# Patient Record
Sex: FEMALE | NOT HISPANIC OR LATINO | Employment: FULL TIME
[De-identification: names, ages, dates, MRNs, and addresses within clinical notes are randomized per-mention and may not be internally consistent; named-entity substitution may affect disease eponyms.]

---

## 2017-07-22 ENCOUNTER — HEALTH MAINTENANCE LETTER (OUTPATIENT)
Age: 27
End: 2017-07-22

## 2017-12-02 ENCOUNTER — RECORDS - HEALTHEAST (OUTPATIENT)
Dept: LAB | Facility: CLINIC | Age: 27
End: 2017-12-02

## 2017-12-02 LAB
HBV SURFACE AG SERPL QL IA: NEGATIVE
HCV AB SERPL QL IA: NEGATIVE
HIV 1+2 AB+HIV1 P24 AG SERPL QL IA: NEGATIVE

## 2018-03-27 ENCOUNTER — RECORDS - HEALTHEAST (OUTPATIENT)
Dept: LAB | Facility: CLINIC | Age: 28
End: 2018-03-27

## 2018-03-28 LAB
HBV SURFACE AB SERPL IA-ACNC: POSITIVE M[IU]/ML
HBV SURFACE AG SERPL QL IA: NEGATIVE
HCV AB SERPL QL IA: NEGATIVE
HIV 1+2 AB+HIV1 P24 AG SERPL QL IA: NEGATIVE

## 2018-11-21 ENCOUNTER — OFFICE VISIT (OUTPATIENT)
Dept: URGENT CARE | Facility: URGENT CARE | Age: 28
End: 2018-11-21
Payer: COMMERCIAL

## 2018-11-21 VITALS
TEMPERATURE: 98.7 F | SYSTOLIC BLOOD PRESSURE: 120 MMHG | RESPIRATION RATE: 16 BRPM | HEIGHT: 63 IN | DIASTOLIC BLOOD PRESSURE: 78 MMHG | HEART RATE: 123 BPM | BODY MASS INDEX: 33.82 KG/M2 | OXYGEN SATURATION: 99 % | WEIGHT: 190.9 LBS

## 2018-11-21 DIAGNOSIS — H92.01 POSTERIOR AURICULAR PAIN OF RIGHT EAR: Primary | ICD-10-CM

## 2018-11-21 PROCEDURE — 99203 OFFICE O/P NEW LOW 30 MIN: CPT | Performed by: PHYSICIAN ASSISTANT

## 2018-11-22 ASSESSMENT — ENCOUNTER SYMPTOMS
FEVER: 0
VOMITING: 0
SORE THROAT: 0
COUGH: 0
CHILLS: 0
DIARRHEA: 0

## 2018-11-22 NOTE — PROGRESS NOTES
"SUBJECTIVE:   Praveena Hernandez is a 28 year old female presenting with a chief complaint of   Chief Complaint   Patient presents with     Lesion     on back of ear       She is a new patient of [facility].    She is presenting to urgent care tonight with a complaint of a swollen and tender area behind the right ear. Has noticed 2 days ago. Symptoms are worsening. She believes the area has increased in size. Denies any trauma or injury. No fever/chills or sore throat. No cough.       Review of Systems   Constitutional: Negative for chills and fever.   HENT: Negative for sore throat.         Tender area right posterior auricular area   Respiratory: Negative for cough.    Gastrointestinal: Negative for diarrhea and vomiting.       History reviewed. No pertinent past medical history.  Family History   Problem Relation Age of Onset     Cancer Father      dx with Hodgkins     Diabetes Maternal Grandmother      Current Outpatient Prescriptions   Medication Sig Dispense Refill     amoxicillin-clavulanate (AUGMENTIN) 875-125 MG per tablet Take 1 tablet by mouth 2 times daily 20 tablet 0     NO ACTIVE MEDICATIONS .       Social History   Substance Use Topics     Smoking status: Never Smoker     Smokeless tobacco: Never Used      Comment: no smokers in the home     Alcohol use No       OBJECTIVE  /78 (BP Location: Right arm, Patient Position: Chair, Cuff Size: Adult Regular)  Pulse 123  Temp 98.7  F (37.1  C) (Oral)  Resp 16  Ht 5' 2.5\" (1.588 m)  Wt 190 lb 14.4 oz (86.6 kg)  LMP 11/01/2018 (Approximate)  SpO2 99%  Breastfeeding? No  BMI 34.36 kg/m2    Physical Exam   Constitutional: She appears well-developed and well-nourished. No distress.   HENT:   Head: Normocephalic and atraumatic.   Right Ear: Tympanic membrane and external ear normal.   Left Ear: Tympanic membrane and external ear normal.   Mouth/Throat: Oropharynx is clear and moist.   Tender area right posterior auricular area, about 2 cm in diameter. No " erythema. No fluctuance. Query reactive lymph node vs infection   Eyes: Conjunctivae are normal.   Neck: Normal range of motion.   Cardiovascular: Regular rhythm and normal heart sounds.    Pulmonary/Chest: Effort normal and breath sounds normal. No respiratory distress.   Neurological: She is alert.   Skin: Skin is warm and dry.   Psychiatric: She has a normal mood and affect.       Labs:  No results found for this or any previous visit (from the past 24 hour(s)).        ASSESSMENT:      ICD-10-CM    1. Posterior auricular pain of right ear H92.01 amoxicillin-clavulanate (AUGMENTIN) 875-125 MG per tablet        Medical Decision Making:    Differential Diagnosis:  Reactive lymph node, infection etc...    Serious Comorbid Conditions:  Adult:  None    PLAN:    Right posterior auricular pain: query reactive lymph node vs infection. Augmentin is prescribed. She can take tylenol or motrin as needed for pain. Follow up if any worsening symptoms. She agrees.     Followup:    If not improving or if condition worsens, follow up with your Primary Care Provider

## 2019-12-20 ENCOUNTER — RECORDS - HEALTHEAST (OUTPATIENT)
Dept: LAB | Facility: CLINIC | Age: 29
End: 2019-12-20

## 2019-12-23 ENCOUNTER — AMBULATORY - HEALTHEAST (OUTPATIENT)
Dept: INFECTIOUS DISEASES | Facility: CLINIC | Age: 29
End: 2019-12-23

## 2020-01-10 ENCOUNTER — RECORDS - HEALTHEAST (OUTPATIENT)
Dept: LAB | Facility: CLINIC | Age: 30
End: 2020-01-10

## 2020-01-10 LAB
ALBUMIN SERPL-MCNC: 3.7 G/DL (ref 3.5–5)
ALP SERPL-CCNC: 87 U/L (ref 45–120)
ALT SERPL W P-5'-P-CCNC: 29 U/L (ref 0–45)
ANION GAP SERPL CALCULATED.3IONS-SCNC: 8 MMOL/L (ref 5–18)
AST SERPL W P-5'-P-CCNC: 29 U/L (ref 0–40)
BILIRUB SERPL-MCNC: 0.3 MG/DL (ref 0–1)
BUN SERPL-MCNC: 13 MG/DL (ref 8–22)
CALCIUM SERPL-MCNC: 9.2 MG/DL (ref 8.5–10.5)
CHLORIDE BLD-SCNC: 105 MMOL/L (ref 98–107)
CO2 SERPL-SCNC: 23 MMOL/L (ref 22–31)
CREAT SERPL-MCNC: 0.72 MG/DL (ref 0.6–1.1)
ERYTHROCYTE [DISTWIDTH] IN BLOOD BY AUTOMATED COUNT: 13.2 % (ref 11–14.5)
GFR SERPL CREATININE-BSD FRML MDRD: >60 ML/MIN/1.73M2
GLUCOSE BLD-MCNC: 87 MG/DL (ref 70–125)
HCT VFR BLD AUTO: 38 % (ref 35–47)
HGB BLD-MCNC: 12 G/DL (ref 12–16)
MCH RBC QN AUTO: 26.8 PG (ref 27–34)
MCHC RBC AUTO-ENTMCNC: 31.6 G/DL (ref 32–36)
MCV RBC AUTO: 85 FL (ref 80–100)
PLATELET # BLD AUTO: 307 THOU/UL (ref 140–440)
PMV BLD AUTO: 9.5 FL (ref 8.5–12.5)
POTASSIUM BLD-SCNC: 4 MMOL/L (ref 3.5–5)
PROT SERPL-MCNC: 7.4 G/DL (ref 6–8)
RBC # BLD AUTO: 4.48 MILL/UL (ref 3.8–5.4)
SODIUM SERPL-SCNC: 136 MMOL/L (ref 136–145)
WBC: 7.8 THOU/UL (ref 4–11)

## 2020-06-09 ENCOUNTER — RESULTS ONLY (OUTPATIENT)
Dept: LAB | Age: 30
End: 2020-06-09

## 2020-06-09 ENCOUNTER — APPOINTMENT (OUTPATIENT)
Dept: URGENT CARE | Facility: URGENT CARE | Age: 30
End: 2020-06-09
Payer: COMMERCIAL

## 2020-06-10 LAB
SARS-COV-2 RNA SPEC QL NAA+PROBE: NOT DETECTED
SPECIMEN SOURCE: NORMAL

## 2020-08-21 ENCOUNTER — RECORDS - HEALTHEAST (OUTPATIENT)
Dept: LAB | Facility: CLINIC | Age: 30
End: 2020-08-21

## 2020-08-26 LAB
GAMMA INTERFERON BACKGROUND BLD IA-ACNC: 0.06 IU/ML
M TB IFN-G BLD-IMP: NEGATIVE
MITOGEN IGNF BCKGRD COR BLD-ACNC: -0.01 IU/ML
MITOGEN IGNF BCKGRD COR BLD-ACNC: 0.01 IU/ML
QTF INTERPRETATION: NORMAL
QTF MITOGEN - NIL: 5.13 IU/ML

## 2021-05-24 ENCOUNTER — HOSPITAL ENCOUNTER (EMERGENCY)
Dept: EMERGENCY MEDICINE | Facility: HOSPITAL | Age: 31
Discharge: HOME | End: 2021-05-24
Attending: EMERGENCY MEDICINE
Payer: COMMERCIAL

## 2021-05-24 ENCOUNTER — RECORDS - HEALTHEAST (OUTPATIENT)
Dept: LAB | Facility: HOSPITAL | Age: 31
End: 2021-05-24
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
## 2021-05-24 LAB
ALBUMIN SERPL-MCNC: 3.8 G/DL (ref 3.5–5)
ALP SERPL-CCNC: 75 U/L (ref 45–120)
ALP SERPL-CCNC: 76 U/L (ref 45–120)
ALT SERPL W P-5'-P-CCNC: 28 U/L (ref 0–45)
ALT SERPL W P-5'-P-CCNC: 29 U/L (ref 0–45)
ANION GAP SERPL CALCULATED.3IONS-SCNC: 11 MMOL/L (ref 5–18)
ANION GAP SERPL CALCULATED.3IONS-SCNC: 8 MMOL/L (ref 5–18)
AST SERPL W P-5'-P-CCNC: 25 U/L (ref 0–40)
AST SERPL W P-5'-P-CCNC: 27 U/L (ref 0–40)
BASOPHILS # BLD AUTO: 0 THOU/UL (ref 0–0.2)
BASOPHILS NFR BLD AUTO: 0 % (ref 0–2)
BILIRUB DIRECT SERPL-MCNC: <0.1 MG/DL
BILIRUB SERPL-MCNC: 0.2 MG/DL (ref 0–1)
BILIRUB SERPL-MCNC: 0.2 MG/DL (ref 0–1)
BUN SERPL-MCNC: 13 MG/DL (ref 8–22)
BUN SERPL-MCNC: 13 MG/DL (ref 8–22)
CALCIUM SERPL-MCNC: 8.9 MG/DL (ref 8.5–10.5)
CALCIUM SERPL-MCNC: 9 MG/DL (ref 8.5–10.5)
CHLORIDE BLD-SCNC: 107 MMOL/L (ref 98–107)
CHLORIDE BLD-SCNC: 107 MMOL/L (ref 98–107)
CK SERPL-CCNC: 117 U/L (ref 30–190)
CO2 SERPL-SCNC: 21 MMOL/L (ref 22–31)
CO2 SERPL-SCNC: 22 MMOL/L (ref 22–31)
CREAT SERPL-MCNC: 0.74 MG/DL (ref 0.6–1.1)
CREAT SERPL-MCNC: 0.75 MG/DL (ref 0.6–1.1)
EOSINOPHIL # BLD AUTO: 0.1 THOU/UL (ref 0–0.4)
EOSINOPHIL NFR BLD AUTO: 2 % (ref 0–6)
ERYTHROCYTE [DISTWIDTH] IN BLOOD BY AUTOMATED COUNT: 13.2 % (ref 11–14.5)
GFR SERPL CREATININE-BSD FRML MDRD: >60 ML/MIN/1.73M2
GFR SERPL CREATININE-BSD FRML MDRD: >60 ML/MIN/1.73M2
GLUCOSE BLD-MCNC: 102 MG/DL (ref 70–125)
GLUCOSE BLD-MCNC: 105 MG/DL (ref 70–125)
HCG SERPL QL: NEGATIVE
HCT VFR BLD AUTO: 40.4 % (ref 35–47)
HGB BLD-MCNC: 12.5 G/DL (ref 12–16)
HIV 1+2 AB+HIV1 P24 AG SERPL QL IA: NEGATIVE
IMM GRANULOCYTES # BLD: 0 THOU/UL
IMM GRANULOCYTES NFR BLD: 0 %
LYMPHOCYTES # BLD AUTO: 2.4 THOU/UL (ref 0.8–4.4)
LYMPHOCYTES NFR BLD AUTO: 32 % (ref 20–40)
MCH RBC QN AUTO: 26.7 PG (ref 27–34)
MCHC RBC AUTO-ENTMCNC: 30.9 G/DL (ref 32–36)
MCV RBC AUTO: 86 FL (ref 80–100)
MONOCYTES # BLD AUTO: 0.5 THOU/UL (ref 0–0.9)
MONOCYTES NFR BLD AUTO: 7 % (ref 2–10)
NEUTROPHILS # BLD AUTO: 4.4 THOU/UL (ref 2–7.7)
NEUTROPHILS NFR BLD AUTO: 59 % (ref 50–70)
PHOSPHATE SERPL-MCNC: 3 MG/DL (ref 2.5–4.5)
PLATELET # BLD AUTO: 351 THOU/UL (ref 140–440)
PMV BLD AUTO: 9.9 FL (ref 8.5–12.5)
POTASSIUM BLD-SCNC: 4.5 MMOL/L (ref 3.5–5)
POTASSIUM BLD-SCNC: 5 MMOL/L (ref 3.5–5)
PROT SERPL-MCNC: 7.8 G/DL (ref 6–8)
PROT SERPL-MCNC: 7.9 G/DL (ref 6–8)
RBC # BLD AUTO: 4.68 MILL/UL (ref 3.8–5.4)
SODIUM SERPL-SCNC: 137 MMOL/L (ref 136–145)
SODIUM SERPL-SCNC: 139 MMOL/L (ref 136–145)
WBC: 7.5 THOU/UL (ref 4–11)

## 2021-05-25 LAB
HBV SURFACE AG SERPL QL IA: NEGATIVE
HCV AB SERPL QL IA: NEGATIVE

## 2021-05-29 ENCOUNTER — HEALTH MAINTENANCE LETTER (OUTPATIENT)
Age: 31
End: 2021-05-29

## 2021-06-17 NOTE — ED TRIAGE NOTES
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 Here for prophylactic treatment. Already had blood work done.

## 2021-06-17 NOTE — ED PROVIDER NOTES
EMERGENCY DEPARTMENT ENCOUNTER      NAME: Praveena Hernandez  AGE: 30 y.o. female  YOB: 1990  MRN: [id number]  EVALUATION DATE & TIME: 5/24/2021  3:25 PM    PCP: Angus Mac PA-C    ED PROVIDER: Edgar Ponce M.D.      Chief Complaint   Patient presents with     Needle Stick         FINAL IMPRESSION:  1. Needle stick, hypodermic, accidental, initial encounter          ED COURSE & MEDICAL DECISION MAKING:    Pertinent Labs & Imaging studies reviewed. (See chart for details)  I did see and examine Praveena Hernandez.     Accidental needlestick at work.  This was a low bore needle that had been used on a patient.  The patient was unable to be contacted despite multiple attempts.    We have been in contact with Kaitlin with occupational health and we will start dual medication postexposure prophylactic therapy here and send off the initial laboratory studies via the exposure order set protocol.    She states that while on postexposure prophylactic medication in the past she had very bad nausea and vertigo.  For this reason I will prescribe some Zofran, meclizine and Valium that she can take for symptomatic management.    She will not drive if she is on the Valium.  She will need to follow very closely with occupational health.  She has no other complaints plan is for discharge at this time    At the conclusion of the encounter I discussed the results of all of the tests and the disposition. The questions were answered. The patient or family acknowledged understanding and was agreeable with the care plan.     3:32 PM I met with the patient, obtained history, performed an initial exam, and discussed options and plan for diagnostics and treatment here in the ED. PPE (surgical mask and gloves) was worn during patient encounters while patient wore a mask.       MEDICATIONS GIVEN IN THE EMERGENCY:  Medications   emtricitabine-tenofovir (TDF) 200-300 mg per tablet 1 tablet (TRUVADA) (has no administration in time range)    raltegravir tablet 400 mg (ISENTRESS) (has no administration in time range)       NEW PRESCRIPTIONS STARTED AT TODAY'S ER VISIT  Current Discharge Medication List      START taking these medications    Details   diazePAM (VALIUM) 5 MG tablet Take 1 tablet (5 mg total) by mouth every 12 (twelve) hours as needed for other (vertigo).  Qty: 15 tablet, Refills: 0    Associated Diagnoses: Needle stick, hypodermic, accidental, initial encounter      emtricitabine-tenofovir, TDF, (TRUVADA) 200-300 mg per tablet Take 1 tablet by mouth daily for 5 days.  Qty: 5 tablet, Refills: 0    Associated Diagnoses: Needle stick, hypodermic, accidental, initial encounter      meclizine (ANTIVERT) 25 mg tablet Take 1 tablet (25 mg total) by mouth 3 (three) times a day as needed.  Qty: 30 tablet, Refills: 0    Associated Diagnoses: Needle stick, hypodermic, accidental, initial encounter      ondansetron (ZOFRAN) 4 MG tablet Take 1 tablet (4 mg total) by mouth every 6 (six) hours for 3 days.  Qty: 12 tablet, Refills: 0    Associated Diagnoses: Needle stick, hypodermic, accidental, initial encounter      raltegravir (ISENTRESS) 400 mg tablet Take 1 tablet (400 mg total) by mouth 2 (two) times a day for 5 days.  Qty: 10 tablet, Refills: 0    Associated Diagnoses: Needle stick, hypodermic, accidental, initial encounter         CONTINUE these medications which have NOT CHANGED    Details   amoxicillin-clavulanate (AUGMENTIN) 875-125 mg per tablet Take 1 tablet by mouth 2 (two) times a day.                =================================================================    HPI    Patient information was obtained from: Patient    Use of Intrepreter: N/A     Praveena Hernandez is a 30 y.o. female without a pertinent medical history who presents to the ED for evaluation of needle stick.    Patient reports having an accidental needlestick with a hollow bore needle that was used for local anesthetic on lack repair on the patient's a couple of hours ago.   The patient is ready been discharged and is not answering phone calls for return for any testing.    The patient has no other complaints during this visit.    Patient denies additional medical concerns or complaints at this time.      REVIEW OF SYSTEMS   Review of Systems   Respiratory: Negative for shortness of breath.    Cardiovascular: Negative for chest pain.   Gastrointestinal: Negative for abdominal pain.   Skin: Positive for wound.        VITALS:  Patient Vitals for the past 24 hrs:   BP Temp Temp src Pulse Resp SpO2   05/24/21 1523 127/85 98.3  F (36.8  C) Oral (!) 122 16 99 %       PHYSICAL EXAM    Physical Exam   Constitutional: She appears well-developed and well-nourished. No distress.   HENT:   Head: Normocephalic and atraumatic.   Pulmonary/Chest: Effort normal.   Neurological: She is alert.   Skin: Skin is warm and dry.   No wound visualized at this point   Psychiatric: She has a normal mood and affect.   Nursing note and vitals reviewed.        PAST MEDICAL HISTORY:  Past Medical History:   Diagnosis Date     Healthy adult        PAST SURGICAL HISTORY:  Past Surgical History:   Procedure Laterality Date     other      no prior surgery         CURRENT MEDICATIONS:    No current facility-administered medications on file prior to encounter.      Current Outpatient Medications on File Prior to Encounter   Medication Sig     amoxicillin-clavulanate (AUGMENTIN) 875-125 mg per tablet Take 1 tablet by mouth 2 (two) times a day.       ALLERGIES:  Allergies   Allergen Reactions     Sulfa (Sulfonamide Antibiotics) Shortness Of Breath       FAMILY HISTORY:  Family History   Problem Relation Age of Onset     Thyroid disease Mother      No Medical Problems Sister      No Medical Problems Sister      No Medical Problems Father        SOCIAL HISTORY:   Social History     Socioeconomic History     Marital status: Single     Spouse name: None     Number of children: None     Years of education: None     Highest  education level: None   Occupational History     None   Social Needs     Financial resource strain: None     Food insecurity     Worry: None     Inability: None     Transportation needs     Medical: None     Non-medical: None   Tobacco Use     Smoking status: Never Smoker     Smokeless tobacco: Never Used   Substance and Sexual Activity     Alcohol use: No     Comment: rare     Drug use: No     Sexual activity: Never     Partners: Male   Lifestyle     Physical activity     Days per week: None     Minutes per session: None     Stress: None   Relationships     Social connections     Talks on phone: None     Gets together: None     Attends Quaker service: None     Active member of club or organization: None     Attends meetings of clubs or organizations: None     Relationship status: None     Intimate partner violence     Fear of current or ex partner: None     Emotionally abused: None     Physically abused: None     Forced sexual activity: None   Other Topics Concern     None   Social History Narrative    Lives with parents and sisters.  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  She exercises 3 days/week with walking and bahman classes.            LAB:  All pertinent labs reviewed and interpreted.  No results found for this visit on 05/24/21.    RADIOLOGY:  Reviewed all pertinent imaging. Please see official radiology report.  No results found.        I, Stephanie Flores am serving as a scribe to document services personally performed by Dr. Ponce based on my observation and the provider's statements to me. I, Edgar Ponce MD attest that Stephanie Flores is acting in a scribe capacity, has observed my performance of the services and has documented them in accordance with my direction.    Edgar Ponce M.D.  Emergency Medicine  [facility] EMERGENCY DEPARTMENT  [address]  [facility] [address]  Dept: [phone]  Loc: [phone]         Edgar Ponce MD  05/24/21 1479

## 2021-08-23 ENCOUNTER — APPOINTMENT (OUTPATIENT)
Dept: FAMILY MEDICINE | Facility: CLINIC | Age: 31
End: 2021-08-23
Payer: COMMERCIAL

## 2021-08-23 ENCOUNTER — LAB REQUISITION (OUTPATIENT)
Dept: LAB | Facility: CLINIC | Age: 31
End: 2021-08-23

## 2021-08-23 LAB — SARS-COV-2 RNA RESP QL NAA+PROBE: NEGATIVE

## 2021-08-23 PROCEDURE — U0003 INFECTIOUS AGENT DETECTION BY NUCLEIC ACID (DNA OR RNA); SEVERE ACUTE RESPIRATORY SYNDROME CORONAVIRUS 2 (SARS-COV-2) (CORONAVIRUS DISEASE [COVID-19]), AMPLIFIED PROBE TECHNIQUE, MAKING USE OF HIGH THROUGHPUT TECHNOLOGIES AS DESCRIBED BY CMS-2020-01-R: HCPCS | Performed by: INTERNAL MEDICINE

## 2021-08-27 ENCOUNTER — LAB REQUISITION (OUTPATIENT)
Dept: LAB | Facility: CLINIC | Age: 31
End: 2021-08-27

## 2021-08-27 ENCOUNTER — APPOINTMENT (OUTPATIENT)
Dept: FAMILY MEDICINE | Facility: CLINIC | Age: 31
End: 2021-08-27
Payer: COMMERCIAL

## 2021-08-27 PROCEDURE — U0003 INFECTIOUS AGENT DETECTION BY NUCLEIC ACID (DNA OR RNA); SEVERE ACUTE RESPIRATORY SYNDROME CORONAVIRUS 2 (SARS-COV-2) (CORONAVIRUS DISEASE [COVID-19]), AMPLIFIED PROBE TECHNIQUE, MAKING USE OF HIGH THROUGHPUT TECHNOLOGIES AS DESCRIBED BY CMS-2020-01-R: HCPCS | Performed by: INTERNAL MEDICINE

## 2021-08-28 LAB — SARS-COV-2 RNA RESP QL NAA+PROBE: NEGATIVE

## 2021-09-18 ENCOUNTER — HEALTH MAINTENANCE LETTER (OUTPATIENT)
Age: 31
End: 2021-09-18

## 2021-11-24 DIAGNOSIS — Z57.8 EMPLOYEE EXPOSURE TO BLOOD: Primary | ICD-10-CM

## 2021-11-24 SDOH — HEALTH STABILITY - PHYSICAL HEALTH: OCCUPATIONAL EXPOSURE TO OTHER RISK FACTORS: Z57.8

## 2021-12-16 ENCOUNTER — APPOINTMENT (OUTPATIENT)
Dept: FAMILY MEDICINE | Facility: CLINIC | Age: 31
End: 2021-12-16

## 2021-12-16 ENCOUNTER — LAB REQUISITION (OUTPATIENT)
Dept: LAB | Facility: CLINIC | Age: 31
End: 2021-12-16

## 2021-12-16 LAB — SARS-COV-2 RNA RESP QL NAA+PROBE: NEGATIVE

## 2021-12-16 PROCEDURE — U0005 INFEC AGEN DETEC AMPLI PROBE: HCPCS | Performed by: INTERNAL MEDICINE

## 2022-06-25 ENCOUNTER — HEALTH MAINTENANCE LETTER (OUTPATIENT)
Age: 32
End: 2022-06-25

## 2022-11-20 ENCOUNTER — HEALTH MAINTENANCE LETTER (OUTPATIENT)
Age: 32
End: 2022-11-20

## 2023-11-01 ENCOUNTER — TELEPHONE (OUTPATIENT)
Dept: SURGERY | Facility: CLINIC | Age: 33
End: 2023-11-01

## 2023-11-01 ENCOUNTER — MYC MEDICAL ADVICE (OUTPATIENT)
Dept: FAMILY MEDICINE | Facility: CLINIC | Age: 33
End: 2023-11-01

## 2023-11-01 DIAGNOSIS — Z91.89 AT RISK FOR HYPERTENSION: ICD-10-CM

## 2023-11-01 DIAGNOSIS — O09.91 SUPERVISION OF HIGH-RISK PREGNANCY, FIRST TRIMESTER: Primary | ICD-10-CM

## 2023-11-01 DIAGNOSIS — Z91.89 AT RISK FOR VENOUS THROMBOEMBOLISM (VTE): ICD-10-CM

## 2023-11-26 LAB
ABO/RH(D): NORMAL
ANTIBODY SCREEN: NEGATIVE
SPECIMEN EXPIRATION DATE: NORMAL

## 2023-11-27 ENCOUNTER — HOSPITAL ENCOUNTER (OUTPATIENT)
Dept: ULTRASOUND IMAGING | Facility: CLINIC | Age: 33
Discharge: HOME | End: 2023-11-27
Attending: ADVANCED PRACTICE MIDWIFE
Payer: COMMERCIAL

## 2023-11-27 ENCOUNTER — LAB (OUTPATIENT)
Dept: LAB | Facility: CLINIC | Age: 33
End: 2023-11-27
Attending: ADVANCED PRACTICE MIDWIFE
Payer: COMMERCIAL

## 2023-11-27 ENCOUNTER — PRENATAL OFFICE VISIT (OUTPATIENT)
Dept: MIDWIFE SERVICES | Facility: CLINIC | Age: 33
End: 2023-11-27
Payer: COMMERCIAL

## 2023-11-27 VITALS
HEIGHT: 63 IN | BODY MASS INDEX: 41.29 KG/M2 | SYSTOLIC BLOOD PRESSURE: 124 MMHG | DIASTOLIC BLOOD PRESSURE: 80 MMHG | WEIGHT: 233 LBS | HEART RATE: 92 BPM

## 2023-11-27 DIAGNOSIS — O09.91 SUPERVISION OF HIGH-RISK PREGNANCY, FIRST TRIMESTER: ICD-10-CM

## 2023-11-27 DIAGNOSIS — O09.899 SUPERVISION OF OTHER HIGH RISK PREGNANCY, ANTEPARTUM: ICD-10-CM

## 2023-11-27 DIAGNOSIS — Z91.89 AT RISK FOR HYPERTENSION: ICD-10-CM

## 2023-11-27 DIAGNOSIS — O09.899 SUPERVISION OF OTHER HIGH RISK PREGNANCY, ANTEPARTUM: Primary | ICD-10-CM

## 2023-11-27 DIAGNOSIS — R73.02 GLUCOSE INTOLERANCE (IMPAIRED GLUCOSE TOLERANCE): ICD-10-CM

## 2023-11-27 DIAGNOSIS — R73.03 PRE-DIABETES: ICD-10-CM

## 2023-11-27 DIAGNOSIS — Z91.89 AT RISK FOR VENOUS THROMBOEMBOLISM (VTE): ICD-10-CM

## 2023-11-27 LAB
BASOPHILS # BLD AUTO: 0 10E3/UL (ref 0–0.2)
BASOPHILS NFR BLD AUTO: 0 %
EOSINOPHIL # BLD AUTO: 0.1 10E3/UL (ref 0–0.7)
EOSINOPHIL NFR BLD AUTO: 1 %
ERYTHROCYTE [DISTWIDTH] IN BLOOD BY AUTOMATED COUNT: 13.9 % (ref 10–15)
GLUCOSE 1H P 50 G GLC PO SERPL-MCNC: 156 MG/DL (ref 70–129)
HCT VFR BLD AUTO: 37.7 % (ref 35–47)
HGB BLD-MCNC: 12.3 G/DL (ref 11.7–15.7)
IMM GRANULOCYTES # BLD: 0 10E3/UL
IMM GRANULOCYTES NFR BLD: 0 %
LYMPHOCYTES # BLD AUTO: 2.4 10E3/UL (ref 0.8–5.3)
LYMPHOCYTES NFR BLD AUTO: 26 %
MCH RBC QN AUTO: 27.2 PG (ref 26.5–33)
MCHC RBC AUTO-ENTMCNC: 32.6 G/DL (ref 31.5–36.5)
MCV RBC AUTO: 83 FL (ref 78–100)
MONOCYTES # BLD AUTO: 0.7 10E3/UL (ref 0–1.3)
MONOCYTES NFR BLD AUTO: 7 %
NEUTROPHILS # BLD AUTO: 6.1 10E3/UL (ref 1.6–8.3)
NEUTROPHILS NFR BLD AUTO: 66 %
NRBC # BLD AUTO: 0 10E3/UL
NRBC BLD AUTO-RTO: 0 /100
PLATELET # BLD AUTO: 288 10E3/UL (ref 150–450)
RBC # BLD AUTO: 4.53 10E6/UL (ref 3.8–5.2)
WBC # BLD AUTO: 9.3 10E3/UL (ref 4–11)

## 2023-11-27 PROCEDURE — 86850 RBC ANTIBODY SCREEN: CPT

## 2023-11-27 PROCEDURE — 86780 TREPONEMA PALLIDUM: CPT | Performed by: ADVANCED PRACTICE MIDWIFE

## 2023-11-27 PROCEDURE — 99215 OFFICE O/P EST HI 40 MIN: CPT | Performed by: ADVANCED PRACTICE MIDWIFE

## 2023-11-27 PROCEDURE — 36415 COLL VENOUS BLD VENIPUNCTURE: CPT | Performed by: ADVANCED PRACTICE MIDWIFE

## 2023-11-27 PROCEDURE — 86901 BLOOD TYPING SEROLOGIC RH(D): CPT

## 2023-11-27 PROCEDURE — 86803 HEPATITIS C AB TEST: CPT | Performed by: ADVANCED PRACTICE MIDWIFE

## 2023-11-27 PROCEDURE — 87389 HIV-1 AG W/HIV-1&-2 AB AG IA: CPT | Performed by: ADVANCED PRACTICE MIDWIFE

## 2023-11-27 PROCEDURE — 86762 RUBELLA ANTIBODY: CPT | Performed by: ADVANCED PRACTICE MIDWIFE

## 2023-11-27 PROCEDURE — 87086 URINE CULTURE/COLONY COUNT: CPT | Performed by: ADVANCED PRACTICE MIDWIFE

## 2023-11-27 PROCEDURE — 82950 GLUCOSE TEST: CPT | Performed by: ADVANCED PRACTICE MIDWIFE

## 2023-11-27 PROCEDURE — 76801 OB US < 14 WKS SINGLE FETUS: CPT

## 2023-11-27 PROCEDURE — 87340 HEPATITIS B SURFACE AG IA: CPT | Performed by: ADVANCED PRACTICE MIDWIFE

## 2023-11-27 PROCEDURE — 85025 COMPLETE CBC W/AUTO DIFF WBC: CPT | Performed by: ADVANCED PRACTICE MIDWIFE

## 2023-11-27 RX ORDER — PYRIDOXINE HCL (VITAMIN B6) 25 MG
25 TABLET ORAL DAILY
Status: ON HOLD | COMMUNITY
End: 2024-05-27

## 2023-11-27 RX ORDER — AZELAIC ACID 0.15 G/G
GEL TOPICAL
Status: ON HOLD | COMMUNITY
Start: 2023-09-15 | End: 2024-05-27

## 2023-11-27 RX ORDER — ALBUTEROL SULFATE 90 UG/1
1-2 AEROSOL, METERED RESPIRATORY (INHALATION)
COMMUNITY
Start: 2023-10-11

## 2023-11-27 NOTE — PROGRESS NOTES
PRENATAL VISIT   FIRST OBSTETRICAL EXAM      Subjective:     Praveena is a 33 year old female, who presents to clinic today for a first OB visit.  She is accompanied by her  Rosendo.      This pregnancy was a planned.  The couple haven't been preventing pregnancy for 2 years, and are so excited to finally be pregnant!      DATING  Patient's last menstrual period was 2023.  Praveena has very irregular menstrual cycles (approximately 5 cycles/year in the past few years), therefore pregnancy is being dated by US.  Expected Date of Delivery: 2024, by at 10.5 week ultrasound.        HISTORY  Obstetric and Gynecologic History  OB History    Para Term  AB Living   1 0 0 0 0 0   SAB IAB Ectopic Multiple Live Births   0 0 0 0 0      # Outcome Date GA Lbr [place]/2nd Weight Sex Delivery Anes PTL Lv   1 Current                Past Medical/Surgical History  Past Medical History:   Diagnosis Date    Asthma     Mild, intermittent asthma.  Albuterol PRN.  Trigger is viral or other illness.  Some cold trigger too    Female infertility     Attempted pregnancy X2 years.  History of very irregular cycles through whole lifetime (no dx PCOS or other).  Recently ~5 periods/year (will skip up to 5-6 months).  Got pregnant on own in 2023!    Irregular menstrual cycle     History of very irregular cycles through whole lifetime (no dx PCOS or other dx). In recent years (4472-1981) having ~5 periods/year (will skip up to 5-6 months, and other times will happen monthly)    Migraine     First started happening in early 20s.  Irregular in nature (may get a grouping of a few in a week, and then won't have any for months).  Gets light sensitivity.  Uses OTC excedrine/water/rest in dark room    Pre-diabetes     Diagnosed with this in the past     Past Surgical History:   Procedure Laterality Date    wisdom teeth Bilateral     age 19       Family History  Family History   Problem Relation Age of Onset    Hypothyroidism  "Mother     Impaired Fasting Glucose Mother         Pre-diabetes diagnosed.  Then mom changed diet/lifestyle and condition improved    Morbid Obesity Father     Preeclampsia Sister         Diagnosed postpartum.  On MgSO4 and BP meds    Heart Defect Sister         \"Hole in heart\".  Surgical repair as toddler as wasn't diagnosed until then    Diabetes Type 2  Maternal Grandmother     Chronic Obstructive Pulmonary Disease Maternal Grandfather         smoker since young teen    Cerebrovascular Disease Paternal Grandmother     Unknown/Adopted Paternal Grandfather         (don't know history on him)    Morbid Obesity Paternal Aunt         ~400-500 lbs    Morbid Obesity Paternal Aunt         ~400-500 lbs    Infertility Paternal Aunt     Myocardial Infarction Paternal Uncle          at 42       Social History  Social History     Socioeconomic History    Marital status:      Spouse name: Rosendo Vincent    Number of children: 0    Years of education: masters    Highest education level: Master's degree (e.g., MA, MS, Fabien, MEd, MSW, KYLIE)   Occupational History    Occupation: RN - in ED part time     Comment: [employer])    Occupation: Recently finished Acute Care Nurse Practitioner program (still studying for [email or web address])     Comment: Graduated from [employer] 1 year ago   Tobacco Use    Smoking status: Never     Passive exposure: Never    Smokeless tobacco: Never    Tobacco comments:     no smokers in the home   Vaping Use    Vaping Use: Never used   Substance and Sexual Activity    Alcohol use: No     Comment: Alcoholic Drinks/day: rare    Drug use: No    Sexual activity: Yes     Partners: Male       Allergies  Sulfa antibiotics    Current Medications    Current Outpatient Medications   Medication Sig Dispense Refill    albuterol (PROAIR HFA/PROVENTIL HFA/VENTOLIN HFA) 108 (90 Base) MCG/ACT inhaler Inhale 1-2 puffs into the lungs      azelaic acid (FINACIA) 15 % external gel Apply topically to affected area(s) " "two times daily.      doxylamine (UNISOM) 25 MG TABS tablet Take 25 mg by mouth at bedtime      INOSITOL PO Take 2,000 mg by mouth      Omega-3 Fatty Acids (OMEGA 3 PO) Take 1 capsule by mouth daily      Prenatal Vit-Fe Fumarate-FA (PRENATAL VITAMIN PO) Take 1 tablet by mouth daily      pyridOXINE (VITAMIN  B-6) 25 MG tablet Take 25 mg by mouth daily      amoxicillin-clavulanate (AUGMENTIN) 875-125 MG per tablet Take 1 tablet by mouth 2 times daily 20 tablet 0    NO ACTIVE MEDICATIONS .         Depression/Anxiety evaluation:  EPDS score today:  5  (with \"no\" answer to #10).  History of anxiety or depression:   denies      Additional Pregnancy Risk Evaluation:  General Pregnancy Risk Factors:    Significantly overweight or underweight - BMI 41, Drank any alcohol since LMP - had 2-3 beverages on a vacation before she knew she was pregnant, and Work: standing >4hr/shift  - works as a RN  Based on her risk factors:  will plan to implement recommendations for pregnant women with BMI >40.  Antepartum VTE risk factors:  Two or more risk factors, or 1 * risk factor places patients at higher risk.  Risk factors include:  *BMI greater or equal to 40, current.  Antepartum VTE risk:  present- \"At high risk for VTE\" added to problem list.   Diabetes risk factors:  The patient has the following risk factors for overt diabetes:  Body mass index greater than or equal to 25 kg/m2.   Additional risk factor(s) of:  Glycated hemoglobin greater than or equal to 5.7 percent (39 mmol/mol), impaired glucose tolerance, or impaired fasting glucose on previous testing, First-degree relative with diabetes, Physical inactivity, and Other clinical condition associated with insulin resistance (eg, severe obesity, acanthosis nigricans).    Based on her risk factors:  early 1 hour glucose will be done today during IOB visit.  Pre-eclampsia risk factors:    High Risk factors for preeclampsia:  none.    Moderate Risk factors for preeclampsia:  first " "pregnancy, BMI greater than 30, and family history of preeclampsia (mother or sister).    Based on her risk factors, Praveena is at high risk of preeclampsia (1+ high risk factor or 2+ moderate risk factors).  Initiate low-dose aspirin (81 mg/day) prophylaxis between 12 weeks and 28 weeks of gestation (optimally before 16 weeks) and continue daily until delivery.  Lead exposure risk:  Patient is not a candidate for drawing lead level per [facility] screening tool.   Pre-pregnant BMI and recommended weight gain:    Pre-pregnant BMI is:  41.27  Recommended weight gain:  no weight gain (BMI 40 or greater) encouraged.         Review of Systems  A 12 point comprehensive review of systems was negative except as noted.        Objective:   Objective    Vitals:    11/27/23 1317   BP: 124/80   Pulse: 92   Weight: 105.7 kg (233 lb)   Height: 1.6 m (5' 3\")         Pre-pregnant weight:  233 lbs.   Body mass index is 41.27 kg/m .      Physical Exam:  General: Pleasant, articulate, well-groomed, obese female.  Not in any apparent distress.  Neck: Supple.  Thyroid: Small, symmetrical, no nodules noted.  Lymphadenopathy: Negative.  Lungs: Clear to auscultation bilaterally, and a nonlabored breathing pattern.  Cardio: Regular rate and rhythm, negative for murmur.   Breasts: Symmetrical, nontender, no masses, negative lymphadenopathy, negative nipple discharge.  Abdomen: Soft, nontender, no masses, negative CVAT.  FHT:  Not heard during visit today.    External genitalia: Normal hair distribution, no lesions.  Urethral opening: Without lesions, or tenderness.   Bladder: Without masses, or tenderness.  Vagina: Pink, rugated, normal-appearing discharge.  Cervix: closed, negative CMT  Bimanual: Difficult due to maternal habitus, but finds uterine size consistent with 12 weeks gestation.  Otherwise, mobile, nontender, no masses appreciated.  Adnexa, without masses or tenderness.    Lower extremities: +1 reflexes, no significant " edema.        Assessment / Plan     First OB visit, beginning formal prenatal care.   at 12.1 weeks gestation.    Had an early pregnancy US for dating already this pregnancy.  Unable to hear fetal heart tones today during clinic visit, so US will be done later this afternoon to assess for viability.  Pregnancy Risk Factors identified today from evaluation above:  Obesity - starting BMI 41, recommended no weight gain.  At next visit need to review pregnancy recommendations for women with BMI>40  At risk for Type II and gestational diabetes, and diagnosed in the past with pre-diabetes - early 1 hour glucose was done during clinic visit today.  At risk for pre-eclampsia - RX sent for baby ASA.  Can start now as is >12 weeks  At risk for VTE - plan preventative measures during hospitalization  Physical exam was completed today.    Difficult lab draw today in [facility] clinic.  Only able to get part of needed blood.  Patient elects to do the rest of the labs today at the hospital lab following clinic appointment.  Plan routine IOB labs, 1 hour glucose.  Not in need of pap smear today (last done in 10/2021). Declines GC/CT.  A lead level was not drawn today.  Reviewed genetic screening options.  Genetic aneuploidy screening options reviewed:  Patient desires to have NIPS test today.   Referrals:  Please refer to M at next visit (anatomy screen US to be completed there) .   Early Pregnancy Education completed:  IOB packet given.  Early education reviewed.  Discussed [facility] practice, including prenatal visit schedule, standards of care, scope of practice, clinic and hospital settings, and reviewed clinic versus emergency phone numbers.  Discussed optimal healthy lifestyle during pregnancy.  Discussed importance of taking a prenatal vitamin with folic acid and iron daily, and recommended taking a Vitamin D3 supplement (2,000 Iu/day) and Omega 3/fish oil/DHA, and that research also supports taking 150  mcg of Iodine when pregnant.  Discussed anticipatory guidance for common pregnancy questions/concerns, and warning signs to watch for.  Finally discussed baby friendly hospital policies and recommendations regarding benefits of breastfeeding for both baby and mom.  Given CNM contact information and encouraged to call with any concerns or questions.  Plan to return to clinic in 4 weeks for next routine prenatal visit.      ADDENDUM:  Praveena did not pass her 1 hour glucose test.  Plan made for 3 hour glucose to be done in the next 1-2 weeks.        60 minutes on the date of the encounter doing chart review, review of outside records, review of test results, interpretation of tests, patient visit, documentation, and discussion with family

## 2023-11-28 PROBLEM — R73.03 PRE-DIABETES: Status: ACTIVE | Noted: 2023-11-28

## 2023-11-28 PROBLEM — R73.02 GLUCOSE INTOLERANCE (IMPAIRED GLUCOSE TOLERANCE): Status: ACTIVE | Noted: 2023-11-28

## 2023-11-28 PROBLEM — Z91.89 AT RISK FOR HYPERTENSION: Status: ACTIVE | Noted: 2023-11-28

## 2023-11-28 PROBLEM — O09.91 SUPERVISION OF HIGH-RISK PREGNANCY, FIRST TRIMESTER: Status: ACTIVE | Noted: 2023-11-27

## 2023-11-28 PROBLEM — Z91.89 AT RISK FOR VENOUS THROMBOEMBOLISM (VTE): Status: ACTIVE | Noted: 2023-11-28

## 2023-11-28 LAB
BACTERIA UR CULT: NORMAL
HBV SURFACE AG SERPL QL IA: NONREACTIVE
HCV AB SERPL QL IA: NONREACTIVE
HIV 1+2 AB+HIV1 P24 AG SERPL QL IA: NONREACTIVE
RUBV IGG SERPL QL IA: 3.43 INDEX
RUBV IGG SERPL QL IA: POSITIVE
T PALLIDUM AB SER QL: NONREACTIVE

## 2023-11-28 RX ORDER — ASPIRIN 81 MG/1
81 TABLET, CHEWABLE ORAL DAILY
Qty: 60 TABLET | Refills: 3 | Status: ON HOLD | OUTPATIENT
Start: 2023-11-28 | End: 2024-05-27

## 2023-11-29 NOTE — PATIENT INSTRUCTIONS
"\"We hope you had a positive experience and that you can definitely recommend [facility] to your family and friends. You ll be receiving a survey soon and we look forward to hearing your feedback\".    Welcome to [facility]   and thank you for choosing us for your maternity care provider!  Congratulations!      [employer]  After each of your visits you are welcome to check [employer] for your visit summary including education and links to information relevant to your pregnancy and/or well woman care.   Find the \"Visits\" tab at the top of the page, you will see a list of recent visits and for each visit a for link for \"View After Visit Summary.\" View of your After Visit Summary will allow you to read our recommendations from your visit, review any education provided, and link to websites with useful information.   If you have any questions or difficulty navigating [employer], please feel free to contact us and we will do our best to direct you.  Meet the Midwives from [facility]  You are invited to an informational meet and greet with [facility]'s [facility] Certified Nurse-Midwives. Our free \"Meet the Midwives\" event is a great opportunity to learn about our midwives' philosophy and experience, the hospitals where we can assist with your birth, and answer questions you may have. Partners, friends, and family are welcome to attend. Currently, this is a virtual event.  Date  Last Thursday of every month at 7 pm.    Link to next (live) meeting   [email or web address]  To Join by Telephone (audio only) Call:   [phone] Phone Conference ID: [phone] #    Contact information:  Appointment line and to get a hold of CNM in clinic Monday-Friday 8 am - 5 pm:  [phone].  There are some clinics with early start times (1st appointment 7:40 am) and others with evening hours (last appointment 6:20 pm).  Most are typically open from 8 " am to 5 pm.    CNM on call answering service: [phone].  Specify your hospital of choice and leave a brief message for CNM;  will then page CNM who is on call at your specified hospital and you should receive a call back with 15 minutes.  Be sure that your ringer is audible and that you can accept blocked calls so that we can get back in touch with you! This number should be reserved for urgent needs if during the day, before 8 am, after 5 pm, weekends, holidays.      We support all families in their infant feeding journey. We'll provide education on Breastfeeding/Chestfeeding early and often to help you achieve your goals. Please let us know if you have questions along the way!      Breastfeeding: a Healthy Option for You and Your Baby  Consider breastfeeding for the healthiest way to feed your baby. Ask your midwife or physician for more information.     The choice of how you will feed your baby is important.  Before your baby s birth, you ll want to learn about the benefits of breastfeeding.  [facility] Nurse Midwives [facility] ([place] and [facility]) continue to support Baby Friendly standards; an initiative that was created by the World Health Organization and UNICEF.  This helps give you and your baby the best start in feeding their baby.    Why should I breastfeed my baby?   Babies are less likely to develop childhood obesity or diabetes   Babies are less likely to suffer from recurrent ear infections   Babies are less likely to be hospitalized for respiratory conditions   Breast milk is rich in nutrients and antibodies-it is easy to digest    How does it benefit me?   Lowers the risk for diabetes, breast and ovarian cancer and postpartum depression   Moms can lose  baby weight  more quickly   Cost savings - formula can cost well over $1,500 per year   Convenient - no bottles and nipples to sterilize, no measuring and mixing formula   The physical contact with  breastfeeding can make babies feel secure, warm and comforted     What about formula?  While you and your baby are staying with us at [facility], we will support whatever feeding choice you make for your baby.    Some important considerations:    The American Academy of Pediatrics, the World Health Organization, and many more organizations recommend exclusive breastfeeding for 6 months and continued breastfeeding while adding other foods for the first 1-2 years.    Any amount of breastmilk has benefits to both baby and mother.  Giving formula in replacement of breastfeeding can affect mother s milk supply.  If formula is needed, hospital staff will work with you on a plan to help develop your milk supply.  Formula alters the natural growth of good bacteria in the  stomach.   Research has found that first time mothers who offer formula in the hospital have a shorter duration of breastfeeding.    How can I start to prepare?   Start by having a conversation with your medical provider.   Talk with your partner, family and friends.   Attend a prenatal class that includes breastfeeding preparation. Birth and breastfeeding classes are offered by [employer]. Visit [employer] for class information.   After your baby s birth, hospital staff and lactation consultants will help you and your baby get off to a great start with breastfeeding.      RESOURCES  [facility] Maternity Care:   https://[facility].org/locations/the-birthplace-atChildren's [facility] Maternity Care:   https://[facility].org/locations/the-birthplace-at[facility]    Scroll to the bottom of this page if the above link does not work      Breastfeeding:    OUTPATIENT LACTATION RESOURCES     -Schedule an appointment with a [facility] Nurse Midwives [facility] CECILIA who is also a Lactation Consultant by calling [phone]. We see women for  breastfeeding visits at [facility] and [facility].     Chocolate Milk Club:  http://www.SupportievesselsmidwiferVoltea.com/chocolate-milk-club/  Join the Facebook group or join us for support on the first Monday of each month from 7 to 9 p.m.  , Dr. Cheryl Del Real, DNP, CNM, CNP, IBCLC, ICEA  Phone: [phone]  Fax: [phone]  Email: [email or web address]    R.O.S.E. = Reaching our Sisters Everywhere  Http://www.breastfeedingrose.org/    Black Women Do Breastfeed Blog  www.blackwomendobreastfeed.org    Club Mom breastfeeding support for Black mothers:  Contact Kim Trevizo  Phone: [phone]   Email:  [email or web address].     Blanca Marroquin  Phone: [phone]   Email:  [email or web address]    Club Dad parent support for Black fathers:   Contact Michael Aguirre   Phone: [phone]   Email:  [email or web address]    The ong Breastfeeding Coalition is a wonderful support for [facility] women who are breastfeeding.  They are best found on Facebook.      The Hmong Breastfeeding Coalition has produced a collection of video stories about breastfeeding in the [facility] community, produced by the Hmong Breastfeeding Coalition.  Most are in English, but one on handling breastmilk is in ong.  The video collection is in the middle of the page.    This page also has several other resources on ong breastfeeding.     https://mnbreastfeedingcoalition.org/communities/    WIC program application: Sean Mcclain   Https://www.youtube.com/watch?v=lQoWwPoyGYc    For information on Local WIC services call:  [phone]    You may qualify...  If you are pregnant, nursing, or have a child under age 5, we encourage you to Apply for WIC.    WIC Provides...  Nutrition tips and advice  Support for breastfeeding  Healthy foods like fresh fruits and vegetables, whole grain cereals, bread and tortillas, low fat milk, and baby foods  Caring and  supportive staff  Don't delay! We're here to help!  CALL TODAY FOR A WIC CLINIC NEAR YOU  8-891-IGW-2149 or [phone]     New Parent Connection:   Bere Bernstein, [address] [facility]  In-person meetings on  from 6 pm - 7:15 pm for parents of  to 9 months, at the same site.   All are free, drop-in, no registration required.    There are also free virtual meetings ongoing on :  11:30 am - 12:30 pm for parents of newborns to 3 months  4:15 pm to 5:15 pm for parents of 3 to 9-month olds  For joining info parents should call Christine Powers at [phone]    -Baby Café  Find a Baby Café - Baby Café [employer] (babycafeusa.org)   Search by State ([place]) to find the nearest cafe to you      [facility] Baby Café  Due to COVID-19, all Baby Café sessions are canceled until further notice. For lactation support, please contact one of our bilingual staff:  Alejandra (IBCLC) [phone]  Yana (IBCLC/ Prydeinig) [phone]  Zoua (Hmong) [phone]  Bebeto (Sierra Leonean) [phone]  Baby Café is a free, drop-in service offering breastfeeding/chestfeeding support. Come share tips and socialize with other pregnant, breastfeeding/chestfeeding families. Babies and siblings are welcome (no  available).  We offer:  Professionally trained lactation staff.  Resource books for lending.  Relaxed and fun atmosphere.  Refreshments.   More information  Yana Musa  [phone]  [email or web address]     -Attend a baby weigh in at [facility].  Lactation consultants are available to answer questions  Renae:  1:00 - 2:00  [facility]:  1:00 - 2:00   www.[facility] Medical CenterGiveLooper.RED INNOVA    -Attend one of the New Mama groups at [facility] in [facility].  [facility] also offers one-on-one in home and in office lactation consults.   www.[facility].com    -Attend a LeLeche League meeting.  Multiple groups in several locations throughout the [facility]. The  meetings are no-cost and always informative breastfeeding education session through [employer] La Leche League  Www.bucky.org/     Held at [facility] the second Thursday of each month at 7pm    Childbirth and Parenting Education:     Everyday Miracles:   https://www.everyday-miracles.org/    Free Video Series from [facility]: https://nursing.[facility]/academics/specialty-areas/nurse-midwifery/having-baby-prenatal-videos/having-baby-prenatal-and    Childbirth Education virtual (live) classes: www.Roombeats/classes  The Birth Hour: https://Ceannate/online-childbirth-class/  BirthED: https://www.birthedmn.com/  CRISTIN parenting center: http://amCorewell Health Blodgett HospitalSwitchfly/   (779) 783-THOI  Blooma: (education, yoga & wellness) www.eFlix  Enlightened Mama: www.AllBusiness.comenedmama.SpectraRep   Childbirth collective: (Parent topic nights)  www.childbirthcollective.org/  Hypnobabies:  www.hypnobabiestCoverHound.SpectraRep/  Hypnobirthing:  Http://Hammerless/  Hypnobirthing virtual class: www.Datamolino/hypnobirthing    Information about doulas:  Childbirth collective: http://www.childbirthcollective.org/  Doulas of [place] (VIVEK):  www.vivek.org  [facility]  project: http://twincitiesdoulaproject.com/     Early Childhood and Family Education (ECFE):  ECFE offers parents hands-on learning experiences that will nourish a lifetime of teachable moments.  http://ecfe.info/ecfe-home/    APPS and Podcasts:   Maryanne Echeverria Nurture    Evidence Based Birth  The Birth Hour (for birth stories)   Birthful   Expectful   The Longest Shortest Time  PregnancyPodcast Arlin Greenfield    Book Recommendations:   Cielo Cole's Birthing From Within--first few chapters include a new-age tone, you may prefer to skip it and keep going, because there is good stuff later.  This book recommendation covers emotional preparation, but does cover coping with pain, and use of both pharmacological and  "nonpharmacological methods.    Guide to Childbirth by Reny Yi  Childbirth Without Fear by Tomasa Alves Read    Dr. Hameed' The Pregnancy Book and The Birth Book--the pregnancy book goes month-by month    The Birth Partner by Betsey Cannon    Womanly Art of Breastfeeding by La Leche League International   Bestfeeding by Noelle Gardiner--great pictures    Mothering Your Nursing Toddler, by Piedad Jacinto.   Addresses dealing with so many of the challenging behaviors of a nursing toddler.  How Weaning Happens, by La Leche League.  Discusses weaning at all ages, from medically necessary weaning of an infant, all the way up to age 5 (or older), with why/why not, and strategies.  Very empowering book both for deciding to wean and deciding not to.    American College of Nurse-Midwives (ACNM) http://www.midwife.org/; look at the informational handouts at http://www.midwife.org/Share-With-Women     www.mymidwife.org    Mother to Baby (Medication and Herbal guidance in pregnancy): http://www.mothertobaby.org  Toll-Free Hotline: 185.683.6518  LactMed (Medication use while breastfeeding): http://toxnet.nlm.nih.gov/newtoxnet/lactmed.htm    Women's Health.gov:  http://www.womenshealth.gov/a-z-topics/index.html    American pregnancy association - http://americanpregnancy.org    Centering Pregnancy (group prenatal care option): http://centeringhealthcare.org    March of Dimes www.nubelo.com     FDA - Nutrition  www.mypyramid.gov  Under \"For Consumers,\" click on \"pregnant and breastfeeding women.\"      Centers for Disease Control and Prevention (CDC) - Vaccines : http://www.cdc.gov/vaccines/       When researching information on the web, question the validity of websites.  The domains .gov, .edu and.org tend to be more reliable information.  If there are a lot of advertisements, be cautious of the information provided. Stay away from blogs and chat rooms please!     "

## 2023-12-04 LAB — SCANNED LAB RESULT: NORMAL

## 2023-12-17 ENCOUNTER — TRANSFERRED RECORDS (OUTPATIENT)
Dept: MULTI SPECIALTY CLINIC | Facility: CLINIC | Age: 33
End: 2023-12-17

## 2023-12-17 LAB — PAP SMEAR - HIM PATIENT REPORTED: NORMAL

## 2023-12-21 NOTE — CONFIDENTIAL NOTE
Phone call:    Pt calls with concerns for some elevated BP readings. She has been taking her BP intermittently at work and at home. BP range from 144-129 systolic/116-86 diastolic. Recommended repeat prenatal visit tomorrow if able to for BP check. Discussed parameters for normal BP in pregnancy and elevated BP requiring medication. Disc that if hypertension is diagnosed before 20 weeks of pregnancy, likely diagnosis is chronic hypertension. Pt verbalizes understanding.     JAS Monge, CNM, IBCLC  [facility]  Midwifery

## 2024-01-04 ENCOUNTER — TRANSFERRED RECORDS (OUTPATIENT)
Dept: HEALTH INFORMATION MANAGEMENT | Facility: CLINIC | Age: 34
End: 2024-01-04

## 2024-01-22 ENCOUNTER — TRANSCRIBE ORDERS (OUTPATIENT)
Dept: MATERNAL FETAL MEDICINE | Facility: HOSPITAL | Age: 34
End: 2024-01-22
Payer: COMMERCIAL

## 2024-01-22 ENCOUNTER — MEDICAL CORRESPONDENCE (OUTPATIENT)
Dept: HEALTH INFORMATION MANAGEMENT | Facility: CLINIC | Age: 34
End: 2024-01-22
Payer: COMMERCIAL

## 2024-01-22 DIAGNOSIS — O26.90 PREGNANCY RELATED CONDITION, ANTEPARTUM: Primary | ICD-10-CM

## 2024-02-01 ENCOUNTER — PRE VISIT (OUTPATIENT)
Dept: MATERNAL FETAL MEDICINE | Facility: HOSPITAL | Age: 34
End: 2024-02-01
Payer: COMMERCIAL

## 2024-02-06 ENCOUNTER — HOSPITAL ENCOUNTER (OUTPATIENT)
Dept: ULTRASOUND IMAGING | Facility: CLINIC | Age: 34
Discharge: HOME | End: 2024-02-06
Attending: OBSTETRICS & GYNECOLOGY
Payer: COMMERCIAL

## 2024-02-06 ENCOUNTER — OFFICE VISIT (OUTPATIENT)
Dept: MATERNAL FETAL MEDICINE | Facility: CLINIC | Age: 34
End: 2024-02-06
Attending: OBSTETRICS & GYNECOLOGY
Payer: COMMERCIAL

## 2024-02-06 DIAGNOSIS — O26.90 PREGNANCY RELATED CONDITION, ANTEPARTUM: ICD-10-CM

## 2024-02-06 DIAGNOSIS — O35.2XX0 HEREDITARY DISEASE IN FAMILY POSSIBLY AFFECTING FETUS, AFFECTING MANAGEMENT OF MOTHER IN PREGNANCY, SINGLE OR UNSPECIFIED FETUS: Primary | ICD-10-CM

## 2024-02-06 PROCEDURE — 76811 OB US DETAILED SNGL FETUS: CPT

## 2024-02-06 PROCEDURE — 76811 OB US DETAILED SNGL FETUS: CPT | Mod: 26 | Performed by: OBSTETRICS & GYNECOLOGY

## 2024-02-06 NOTE — PROGRESS NOTES
Please see full imaging report from [facility] program under imaging tab.    Bradley Maloney MD  Maternal Fetal Medicine

## 2024-02-06 NOTE — NURSING NOTE
Patient presents to [facility] for L2 at 22w2d due to BMI 39, subopt. Positive fetal movement. Denies LOF, vaginal bleeding or cramping/contractions. SBAR given to M MD, see their note in Epic.

## 2024-03-27 ENCOUNTER — OFFICE VISIT (OUTPATIENT)
Dept: MATERNAL FETAL MEDICINE | Facility: CLINIC | Age: 34
End: 2024-03-27
Attending: OBSTETRICS & GYNECOLOGY
Payer: COMMERCIAL

## 2024-03-27 ENCOUNTER — HOSPITAL ENCOUNTER (OUTPATIENT)
Dept: ULTRASOUND IMAGING | Facility: CLINIC | Age: 34
Discharge: HOME | End: 2024-03-27
Attending: OBSTETRICS & GYNECOLOGY
Payer: COMMERCIAL

## 2024-03-27 DIAGNOSIS — O35.2XX0 HEREDITARY DISEASE IN FAMILY POSSIBLY AFFECTING FETUS, AFFECTING MANAGEMENT OF MOTHER IN PREGNANCY, SINGLE OR UNSPECIFIED FETUS: ICD-10-CM

## 2024-03-27 DIAGNOSIS — O99.210 OBESITY IN PREGNANCY, ANTEPARTUM: Primary | ICD-10-CM

## 2024-03-27 PROCEDURE — 76816 OB US FOLLOW-UP PER FETUS: CPT | Mod: 26 | Performed by: OBSTETRICS & GYNECOLOGY

## 2024-03-27 PROCEDURE — 76816 OB US FOLLOW-UP PER FETUS: CPT

## 2024-03-27 PROCEDURE — 99213 OFFICE O/P EST LOW 20 MIN: CPT | Mod: 25 | Performed by: OBSTETRICS & GYNECOLOGY

## 2024-03-27 NOTE — NURSING NOTE
Pt at [facility] for follow up ultrasound- see detailed report under imaging tab.  Pt reports positive fetal movement, denies bleeding, contractions, loss of fluid and other concerns at this time.  SBAR given to MD.

## 2024-03-27 NOTE — PROGRESS NOTES
The patient was seen for an ultrasound in the Maternal-Fetal Medicine Center at the [facility] today.  For a detailed report of the ultrasound examination, please see the ultrasound report which can be found under the imaging tab.    If you have questions regarding today's evaluation or if we can be of further service, please contact the Maternal-Fetal Medicine Center.    Kerry Lockett MD  , OB/GYN  Maternal-Fetal Medicine  [phone] (Pager)

## 2024-05-13 ENCOUNTER — TRANSFERRED RECORDS (OUTPATIENT)
Dept: HEALTH INFORMATION MANAGEMENT | Facility: CLINIC | Age: 34
End: 2024-05-13
Payer: COMMERCIAL

## 2024-05-16 LAB — GROUP B STREPTOCOCCUS (EXTERNAL): NEGATIVE

## 2024-05-22 ENCOUNTER — HOSPITAL ENCOUNTER (INPATIENT)
Facility: HOSPITAL | Age: 34
LOS: 5 days | Discharge: HOME | End: 2024-05-27
Attending: ADVANCED PRACTICE MIDWIFE | Admitting: REGISTERED NURSE
Payer: COMMERCIAL

## 2024-05-22 DIAGNOSIS — Z98.891 S/P CESAREAN SECTION: Primary | ICD-10-CM

## 2024-05-22 DIAGNOSIS — D62 ANEMIA DUE TO BLOOD LOSS, ACUTE: ICD-10-CM

## 2024-05-22 PROBLEM — Z36.89 ENCOUNTER FOR TRIAGE IN PREGNANT PATIENT: Status: ACTIVE | Noted: 2024-05-22

## 2024-05-22 PROBLEM — O13.9 GESTATIONAL HYPERTENSION: Status: ACTIVE | Noted: 2024-05-22

## 2024-05-22 LAB
ABO/RH(D): NORMAL
ALBUMIN MFR UR ELPH: 107.1 MG/DL
ALBUMIN SERPL BCG-MCNC: 3.3 G/DL (ref 3.5–5.2)
ALP SERPL-CCNC: 123 U/L (ref 40–150)
ALT SERPL W P-5'-P-CCNC: 11 U/L (ref 0–50)
ANION GAP SERPL CALCULATED.3IONS-SCNC: 11 MMOL/L (ref 7–15)
ANTIBODY SCREEN: NEGATIVE
AST SERPL W P-5'-P-CCNC: 22 U/L (ref 0–45)
BILIRUB SERPL-MCNC: <0.2 MG/DL
BUN SERPL-MCNC: 9.2 MG/DL (ref 6–20)
CALCIUM SERPL-MCNC: 8.6 MG/DL (ref 8.6–10)
CHLORIDE SERPL-SCNC: 104 MMOL/L (ref 98–107)
CREAT SERPL-MCNC: 0.76 MG/DL (ref 0.51–0.95)
CREAT UR-MCNC: 340.4 MG/DL
DEPRECATED HCO3 PLAS-SCNC: 21 MMOL/L (ref 22–29)
EGFRCR SERPLBLD CKD-EPI 2021: >90 ML/MIN/1.73M2
ERYTHROCYTE [DISTWIDTH] IN BLOOD BY AUTOMATED COUNT: 15.8 % (ref 10–15)
GLUCOSE SERPL-MCNC: 102 MG/DL (ref 70–99)
HCT VFR BLD AUTO: 37.4 % (ref 35–47)
HGB BLD-MCNC: 11.7 G/DL (ref 11.7–15.7)
MCH RBC QN AUTO: 26.2 PG (ref 26.5–33)
MCHC RBC AUTO-ENTMCNC: 31.3 G/DL (ref 31.5–36.5)
MCV RBC AUTO: 84 FL (ref 78–100)
PLATELET # BLD AUTO: 242 10E3/UL (ref 150–450)
POTASSIUM SERPL-SCNC: 4.2 MMOL/L (ref 3.4–5.3)
PROT SERPL-MCNC: 6.8 G/DL (ref 6.4–8.3)
PROT/CREAT 24H UR: 0.31 MG/MG CR (ref 0–0.2)
RBC # BLD AUTO: 4.46 10E6/UL (ref 3.8–5.2)
SODIUM SERPL-SCNC: 136 MMOL/L (ref 135–145)
SPECIMEN EXPIRATION DATE: NORMAL
WBC # BLD AUTO: 10.8 10E3/UL (ref 4–11)

## 2024-05-22 PROCEDURE — 80053 COMPREHEN METABOLIC PANEL: CPT | Performed by: REGISTERED NURSE

## 2024-05-22 PROCEDURE — 86900 BLOOD TYPING SEROLOGIC ABO: CPT | Performed by: REGISTERED NURSE

## 2024-05-22 PROCEDURE — 85027 COMPLETE CBC AUTOMATED: CPT | Performed by: REGISTERED NURSE

## 2024-05-22 PROCEDURE — 36415 COLL VENOUS BLD VENIPUNCTURE: CPT | Performed by: REGISTERED NURSE

## 2024-05-22 PROCEDURE — 258N000003 HC RX IP 258 OP 636: Performed by: REGISTERED NURSE

## 2024-05-22 PROCEDURE — 120N000001 HC R&B MED SURG/OB

## 2024-05-22 PROCEDURE — 86780 TREPONEMA PALLIDUM: CPT | Performed by: REGISTERED NURSE

## 2024-05-22 PROCEDURE — 84156 ASSAY OF PROTEIN URINE: CPT | Performed by: REGISTERED NURSE

## 2024-05-22 RX ORDER — CALCIUM CARBONATE 500 MG/1
1000 TABLET, CHEWABLE ORAL 3 TIMES DAILY PRN
Status: DISCONTINUED | OUTPATIENT
Start: 2024-05-22 | End: 2024-05-27 | Stop reason: HOSPADM

## 2024-05-22 RX ORDER — PROCHLORPERAZINE MALEATE 10 MG
10 TABLET ORAL EVERY 6 HOURS PRN
Status: DISCONTINUED | OUTPATIENT
Start: 2024-05-22 | End: 2024-05-26

## 2024-05-22 RX ORDER — LABETALOL HYDROCHLORIDE 5 MG/ML
20-80 INJECTION, SOLUTION INTRAVENOUS EVERY 10 MIN PRN
Status: DISCONTINUED | OUTPATIENT
Start: 2024-05-22 | End: 2024-05-27 | Stop reason: HOSPADM

## 2024-05-22 RX ORDER — CITRIC ACID/SODIUM CITRATE 334-500MG
30 SOLUTION, ORAL ORAL
Status: DISCONTINUED | OUTPATIENT
Start: 2024-05-22 | End: 2024-05-26

## 2024-05-22 RX ORDER — MORPHINE SULFATE 10 MG/ML
10 INJECTION, SOLUTION INTRAMUSCULAR; INTRAVENOUS
Status: DISCONTINUED | OUTPATIENT
Start: 2024-05-22 | End: 2024-05-23

## 2024-05-22 RX ORDER — ONDANSETRON 2 MG/ML
4 INJECTION INTRAMUSCULAR; INTRAVENOUS EVERY 6 HOURS PRN
Status: DISCONTINUED | OUTPATIENT
Start: 2024-05-22 | End: 2024-05-25

## 2024-05-22 RX ORDER — SODIUM CHLORIDE, SODIUM LACTATE, POTASSIUM CHLORIDE, CALCIUM CHLORIDE 600; 310; 30; 20 MG/100ML; MG/100ML; MG/100ML; MG/100ML
10-125 INJECTION, SOLUTION INTRAVENOUS CONTINUOUS
Status: DISCONTINUED | OUTPATIENT
Start: 2024-05-22 | End: 2024-05-27

## 2024-05-22 RX ORDER — MISOPROSTOL 100 UG/1
25 TABLET ORAL
Status: COMPLETED | OUTPATIENT
Start: 2024-05-22 | End: 2024-05-23

## 2024-05-22 RX ORDER — METOCLOPRAMIDE 10 MG/1
10 TABLET ORAL EVERY 6 HOURS PRN
Status: DISCONTINUED | OUTPATIENT
Start: 2024-05-22 | End: 2024-05-26

## 2024-05-22 RX ORDER — LOPERAMIDE HCL 2 MG
2 CAPSULE ORAL
Status: DISCONTINUED | OUTPATIENT
Start: 2024-05-22 | End: 2024-05-26

## 2024-05-22 RX ORDER — FENTANYL CITRATE 50 UG/ML
100 INJECTION, SOLUTION INTRAMUSCULAR; INTRAVENOUS
Status: DISCONTINUED | OUTPATIENT
Start: 2024-05-22 | End: 2024-05-26

## 2024-05-22 RX ORDER — ONDANSETRON 4 MG/1
4 TABLET, ORALLY DISINTEGRATING ORAL EVERY 6 HOURS PRN
Status: DISCONTINUED | OUTPATIENT
Start: 2024-05-22 | End: 2024-05-25

## 2024-05-22 RX ORDER — NALOXONE HYDROCHLORIDE 0.4 MG/ML
0.4 INJECTION, SOLUTION INTRAMUSCULAR; INTRAVENOUS; SUBCUTANEOUS
Status: DISCONTINUED | OUTPATIENT
Start: 2024-05-22 | End: 2024-05-26

## 2024-05-22 RX ORDER — NALOXONE HYDROCHLORIDE 0.4 MG/ML
0.2 INJECTION, SOLUTION INTRAMUSCULAR; INTRAVENOUS; SUBCUTANEOUS
Status: DISCONTINUED | OUTPATIENT
Start: 2024-05-22 | End: 2024-05-26

## 2024-05-22 RX ORDER — FAMOTIDINE 10 MG
10 TABLET ORAL 2 TIMES DAILY
Status: DISCONTINUED | OUTPATIENT
Start: 2024-05-22 | End: 2024-05-23

## 2024-05-22 RX ORDER — OXYTOCIN/0.9 % SODIUM CHLORIDE 30/500 ML
100-340 PLASTIC BAG, INJECTION (ML) INTRAVENOUS CONTINUOUS PRN
Status: DISCONTINUED | OUTPATIENT
Start: 2024-05-22 | End: 2024-05-27 | Stop reason: HOSPADM

## 2024-05-22 RX ORDER — LOPERAMIDE HCL 2 MG
4 CAPSULE ORAL
Status: DISCONTINUED | OUTPATIENT
Start: 2024-05-22 | End: 2024-05-26

## 2024-05-22 RX ORDER — METOCLOPRAMIDE HYDROCHLORIDE 5 MG/ML
10 INJECTION INTRAMUSCULAR; INTRAVENOUS EVERY 6 HOURS PRN
Status: DISCONTINUED | OUTPATIENT
Start: 2024-05-22 | End: 2024-05-26

## 2024-05-22 RX ORDER — HYDRALAZINE HYDROCHLORIDE 20 MG/ML
10 INJECTION INTRAMUSCULAR; INTRAVENOUS
Status: DISCONTINUED | OUTPATIENT
Start: 2024-05-22 | End: 2024-05-27 | Stop reason: HOSPADM

## 2024-05-22 RX ORDER — METHYLERGONOVINE MALEATE 0.2 MG/ML
200 INJECTION INTRAVENOUS
Status: DISCONTINUED | OUTPATIENT
Start: 2024-05-22 | End: 2024-05-26

## 2024-05-22 RX ORDER — KETOROLAC TROMETHAMINE 30 MG/ML
30 INJECTION, SOLUTION INTRAMUSCULAR; INTRAVENOUS
Status: COMPLETED | OUTPATIENT
Start: 2024-05-22 | End: 2024-05-26

## 2024-05-22 RX ORDER — OXYTOCIN 10 [USP'U]/ML
10 INJECTION, SOLUTION INTRAMUSCULAR; INTRAVENOUS
Status: DISCONTINUED | OUTPATIENT
Start: 2024-05-22 | End: 2024-05-26

## 2024-05-22 RX ORDER — IBUPROFEN 800 MG/1
800 TABLET, FILM COATED ORAL
Status: COMPLETED | OUTPATIENT
Start: 2024-05-22 | End: 2024-05-26

## 2024-05-22 RX ORDER — TERBUTALINE SULFATE 1 MG/ML
0.25 INJECTION, SOLUTION SUBCUTANEOUS
Status: DISCONTINUED | OUTPATIENT
Start: 2024-05-22 | End: 2024-05-25

## 2024-05-22 RX ORDER — HYDROXYZINE HYDROCHLORIDE 50 MG/1
100 TABLET, FILM COATED ORAL
Status: DISCONTINUED | OUTPATIENT
Start: 2024-05-22 | End: 2024-05-23

## 2024-05-22 RX ORDER — LIDOCAINE 40 MG/G
CREAM TOPICAL
Status: DISCONTINUED | OUTPATIENT
Start: 2024-05-22 | End: 2024-05-22 | Stop reason: HOSPADM

## 2024-05-22 RX ORDER — PROCHLORPERAZINE 25 MG
25 SUPPOSITORY, RECTAL RECTAL EVERY 12 HOURS PRN
Status: DISCONTINUED | OUTPATIENT
Start: 2024-05-22 | End: 2024-05-26

## 2024-05-22 RX ORDER — MISOPROSTOL 200 UG/1
800 TABLET ORAL
Status: DISCONTINUED | OUTPATIENT
Start: 2024-05-22 | End: 2024-05-26

## 2024-05-22 RX ORDER — OXYTOCIN/0.9 % SODIUM CHLORIDE 30/500 ML
340 PLASTIC BAG, INJECTION (ML) INTRAVENOUS CONTINUOUS PRN
Status: DISCONTINUED | OUTPATIENT
Start: 2024-05-22 | End: 2024-05-26

## 2024-05-22 RX ORDER — CARBOPROST TROMETHAMINE 250 UG/ML
250 INJECTION, SOLUTION INTRAMUSCULAR
Status: DISCONTINUED | OUTPATIENT
Start: 2024-05-22 | End: 2024-05-26

## 2024-05-22 RX ORDER — MISOPROSTOL 200 UG/1
400 TABLET ORAL
Status: DISCONTINUED | OUTPATIENT
Start: 2024-05-22 | End: 2024-05-26

## 2024-05-22 RX ORDER — TRANEXAMIC ACID 10 MG/ML
1 INJECTION, SOLUTION INTRAVENOUS EVERY 30 MIN PRN
Status: DISCONTINUED | OUTPATIENT
Start: 2024-05-22 | End: 2024-05-26

## 2024-05-22 RX ORDER — OXYTOCIN 10 [USP'U]/ML
10 INJECTION, SOLUTION INTRAMUSCULAR; INTRAVENOUS
Status: DISCONTINUED | OUTPATIENT
Start: 2024-05-22 | End: 2024-05-27 | Stop reason: HOSPADM

## 2024-05-22 RX ORDER — LIDOCAINE 40 MG/G
CREAM TOPICAL
Status: DISCONTINUED | OUTPATIENT
Start: 2024-05-22 | End: 2024-05-27 | Stop reason: HOSPADM

## 2024-05-22 RX ADMIN — SODIUM CHLORIDE, POTASSIUM CHLORIDE, SODIUM LACTATE AND CALCIUM CHLORIDE 1000 ML: 600; 310; 30; 20 INJECTION, SOLUTION INTRAVENOUS at 19:20

## 2024-05-22 ASSESSMENT — ACTIVITIES OF DAILY LIVING (ADL)
ADLS_ACUITY_SCORE: 18
ADLS_ACUITY_SCORE: 35
ADLS_ACUITY_SCORE: 18
DOING_ERRANDS_INDEPENDENTLY_DIFFICULTY: NO
ADLS_ACUITY_SCORE: 18

## 2024-05-23 LAB — T PALLIDUM AB SER QL: NONREACTIVE

## 2024-05-23 PROCEDURE — 120N000001 HC R&B MED SURG/OB

## 2024-05-23 PROCEDURE — 250N000011 HC RX IP 250 OP 636: Performed by: ADVANCED PRACTICE MIDWIFE

## 2024-05-23 PROCEDURE — 250N000013 HC RX MED GY IP 250 OP 250 PS 637: Performed by: REGISTERED NURSE

## 2024-05-23 PROCEDURE — 250N000013 HC RX MED GY IP 250 OP 250 PS 637: Performed by: ADVANCED PRACTICE MIDWIFE

## 2024-05-23 RX ORDER — ACETAMINOPHEN 325 MG/1
975 TABLET ORAL EVERY 6 HOURS PRN
Status: DISCONTINUED | OUTPATIENT
Start: 2024-05-23 | End: 2024-05-27 | Stop reason: HOSPADM

## 2024-05-23 RX ORDER — HYDROXYZINE HYDROCHLORIDE 50 MG/1
100 TABLET, FILM COATED ORAL
Status: DISCONTINUED | OUTPATIENT
Start: 2024-05-23 | End: 2024-05-26

## 2024-05-23 RX ORDER — CYCLOBENZAPRINE HCL 5 MG
5 TABLET ORAL
Status: COMPLETED | OUTPATIENT
Start: 2024-05-23 | End: 2024-05-23

## 2024-05-23 RX ORDER — HYDROXYZINE HYDROCHLORIDE 50 MG/1
100 TABLET, FILM COATED ORAL
Status: ACTIVE | OUTPATIENT
Start: 2024-05-23 | End: 2024-05-23

## 2024-05-23 RX ORDER — FAMOTIDINE 10 MG
10 TABLET ORAL 2 TIMES DAILY PRN
Status: DISCONTINUED | OUTPATIENT
Start: 2024-05-23 | End: 2024-05-27 | Stop reason: HOSPADM

## 2024-05-23 RX ORDER — HYDROXYZINE HYDROCHLORIDE 50 MG/1
50 TABLET, FILM COATED ORAL
Status: DISCONTINUED | OUTPATIENT
Start: 2024-05-23 | End: 2024-05-26

## 2024-05-23 RX ORDER — MORPHINE SULFATE 10 MG/ML
15 INJECTION, SOLUTION INTRAMUSCULAR; INTRAVENOUS
Status: DISCONTINUED | OUTPATIENT
Start: 2024-05-23 | End: 2024-05-26

## 2024-05-23 RX ORDER — CYCLOBENZAPRINE HCL 5 MG
5 TABLET ORAL 3 TIMES DAILY PRN
Status: COMPLETED | OUTPATIENT
Start: 2024-05-23 | End: 2024-05-23

## 2024-05-23 RX ORDER — MORPHINE SULFATE 10 MG/ML
10 INJECTION, SOLUTION INTRAMUSCULAR; INTRAVENOUS
Status: DISCONTINUED | OUTPATIENT
Start: 2024-05-23 | End: 2024-05-23 | Stop reason: DRUGHIGH

## 2024-05-23 RX ORDER — DOCUSATE SODIUM 100 MG/1
100 CAPSULE, LIQUID FILLED ORAL 2 TIMES DAILY PRN
Status: DISCONTINUED | OUTPATIENT
Start: 2024-05-23 | End: 2024-05-27 | Stop reason: HOSPADM

## 2024-05-23 RX ADMIN — MISOPROSTOL 25 MCG: 100 TABLET ORAL at 02:11

## 2024-05-23 RX ADMIN — CALCIUM CARBONATE (ANTACID) CHEW TAB 500 MG 1000 MG: 500 CHEW TAB at 00:06

## 2024-05-23 RX ADMIN — FAMOTIDINE 10 MG: 10 TABLET, FILM COATED ORAL at 00:06

## 2024-05-23 RX ADMIN — MISOPROSTOL 25 MCG: 100 TABLET ORAL at 06:43

## 2024-05-23 RX ADMIN — MISOPROSTOL 25 MCG: 100 TABLET ORAL at 12:55

## 2024-05-23 RX ADMIN — MISOPROSTOL 25 MCG: 100 TABLET ORAL at 15:05

## 2024-05-23 RX ADMIN — CYCLOBENZAPRINE HYDROCHLORIDE 5 MG: 5 TABLET, FILM COATED ORAL at 13:37

## 2024-05-23 RX ADMIN — MISOPROSTOL 25 MCG: 100 TABLET ORAL at 23:29

## 2024-05-23 RX ADMIN — MISOPROSTOL 25 MCG: 100 TABLET ORAL at 04:33

## 2024-05-23 RX ADMIN — MISOPROSTOL 25 MCG: 100 TABLET ORAL at 00:05

## 2024-05-23 RX ADMIN — ACETAMINOPHEN 975 MG: 325 TABLET ORAL at 01:38

## 2024-05-23 RX ADMIN — CYCLOBENZAPRINE HYDROCHLORIDE 5 MG: 5 TABLET, FILM COATED ORAL at 21:57

## 2024-05-23 RX ADMIN — MISOPROSTOL 25 MCG: 100 TABLET ORAL at 21:11

## 2024-05-23 RX ADMIN — MISOPROSTOL 25 MCG: 100 TABLET ORAL at 09:08

## 2024-05-23 RX ADMIN — HYDROXYZINE HYDROCHLORIDE 100 MG: 50 TABLET, FILM COATED ORAL at 01:38

## 2024-05-23 RX ADMIN — HYDROXYZINE HYDROCHLORIDE 100 MG: 50 TABLET, FILM COATED ORAL at 23:41

## 2024-05-23 RX ADMIN — MISOPROSTOL 25 MCG: 100 TABLET ORAL at 17:00

## 2024-05-23 RX ADMIN — MISOPROSTOL 25 MCG: 100 TABLET ORAL at 10:55

## 2024-05-23 RX ADMIN — ACETAMINOPHEN 975 MG: 325 TABLET ORAL at 21:20

## 2024-05-23 RX ADMIN — METOCLOPRAMIDE 10 MG: 10 TABLET ORAL at 23:41

## 2024-05-23 RX ADMIN — ACETAMINOPHEN 975 MG: 325 TABLET ORAL at 07:59

## 2024-05-23 RX ADMIN — MISOPROSTOL 25 MCG: 100 TABLET ORAL at 19:12

## 2024-05-23 RX ADMIN — DOXYLAMINE SUCCINATE 25 MG: 25 TABLET ORAL at 00:07

## 2024-05-23 RX ADMIN — MORPHINE SULFATE 15 MG: 10 INJECTION, SOLUTION INTRAMUSCULAR; INTRAVENOUS at 23:42

## 2024-05-23 ASSESSMENT — ACTIVITIES OF DAILY LIVING (ADL)
ADLS_ACUITY_SCORE: 18

## 2024-05-23 NOTE — PROVIDER NOTIFICATION
05/22/24 2030   Provider Notification   Provider Name/Title Jessica Joel CNM   Method of Notification Phone   Request Evaluate in Person   Notification Reason Patient Request;Status Update     RN called CNM to notify of pt status and pt request to discuss plan of care with provider. Pt inquiring about IOL vs elective induction since potential macrosomia was mentioned to pt at prior appointment. RN updated CNM of FHR baseline returning to normal range; fetal tachycardia resolved after fluid bolus.     Plan per CNM is to come in to evaluate and discuss plan of care with pt. Pt wants to wait to start IOL until provider is here. Provider in agreement with plan.

## 2024-05-23 NOTE — PROGRESS NOTES
Patient Name:  Praveena Henrandez  :      1990  MRN:      [id number]    Subjective:  Praveena Hernandez is feeling mild menstrual type cramps. S/p 6 doses of PO cytotec. Good PO fluid intake. Voiding without issue. Her BP's today have been normal. She denies headache, vision changes or RUQ pain. C/o of left sided back muscle spasms and sciatica. Using heating pad not much is helping.  Due to this, she did not sleep well overnight.     Objective:  /58 (BP Location: Left arm, Patient Position: Right side, Cuff Size: Adult Large)   Pulse 67   Temp 98.1  F (36.7  C) (Oral)   Resp 16   LMP 2023   SpO2 98%     FHR:Baseline: 140 bpm, Variability: Moderate (6 - 25 bpm), Accelerations: present and Decelerations: Absent    Uterine contractions: Emy monitor Frequency: Every 1.5-4 minutes, Duration: 50-60 seconds and Intensity: mild    SVE: deferred- closed on admit    Assessment:     at 37w6d  Induction of labor pre-e w/o SF  Category 1 FHTs  Unfavorable cervix  Suspected macrosomia  BMI >40      Plan:   -Discussed finishing cytotec course and checking cervix 2 hours after last dose. Discussed possible need for more ripening. Discussed possible multiday process. Will order sleep meds for tonight if not in labor.   -One time dose of flexeril ordered   -Notify me of any severe range BP's.  -Routine support & management. Encourage position changes, ambulation, rest as desired.  -Anticipate progress and NSVB.     Dr. Mike aware of patient status and remains available for consultation and collaboration as needed.    Provider:  JAS Manning CNM    Date:  2024  Time:  11:35 AM

## 2024-05-23 NOTE — PLAN OF CARE
Problem: Adult Inpatient Plan of Care  Goal: Optimal Comfort and Wellbeing  Outcome: Progressing  Intervention: Monitor Pain and Promote Comfort  Recent Flowsheet Documentation  Taken 5/23/2024 0356 by Asya Bangura RN  Pain Management Interventions:   care clustered   cold applied   pillow support provided   quiet environment facilitated   relaxation techniques promoted   repositioned   rest  Taken 5/23/2024 0248 by Asya Bangura RN  Pain Management Interventions:   relaxation techniques promoted   rest   quiet environment facilitated   heat applied  Taken 5/23/2024 0138 by Asya Bangura RN  Pain Management Interventions:   medication (see MAR)   pain management plan reviewed with patient/caregiver   pillow support provided   quiet environment facilitated   relaxation techniques promoted   repositioned   rest   heat applied   care clustered  Intervention: Provide Person-Centered Care  Recent Flowsheet Documentation  Taken 5/22/2024 2330 by Asya Bangura RN  Trust Relationship/Rapport:   care explained   choices provided   emotional support provided   empathic listening provided   questions answered   questions encouraged   reassurance provided   thoughts/feelings acknowledged     Problem: Labor  Goal: Stable Fetal Wellbeing  Outcome: Progressing  Intervention: Promote and Monitor Fetal Wellbeing  Recent Flowsheet Documentation  Taken 5/22/2024 2330 by Asya Bangura RN  Body Position: position changed independently  Fetal Wellbeing Promotion:   fetal heart rate monitored   intake and output monitored   maternal position adjusted   uterine contraction activity assessed     Problem: Labor  Goal: Acceptable Pain Control  Outcome: Progressing  Intervention: Support Labor Pain Coping and Management  Recent Flowsheet Documentation  Taken 5/22/2024 2330 by Asya Bangura RN  Sensory Stimulation Regulation:   care clustered   lighting decreased   quiet environment promoted     Problem: Hypertensive Disorders in  Pregnancy  Goal: Patient-Fetal Stabilization  Outcome: Progressing  Intervention: Optimize Blood Pressure and Fluid Status  Recent Flowsheet Documentation  Taken 5/22/2024 2330 by Asya Bangura RN  Fetal Wellbeing Promotion:   fetal heart rate monitored   intake and output monitored   maternal position adjusted   uterine contraction activity assessed  Fluid/Electrolyte Management: fluids provided   VSS, , category 1 tracing. Pt states she has a constant low back pain, has some relief with all efforts provided. IOL with Cytotec, has had 4 doses. Emy used for monitoring and has had some difficulty at times, poor signal resulting in erroneous spots on tracing.  Sleeping well now, returns to sleep after cares. Partner is present and supportive.

## 2024-05-23 NOTE — H&P
HISTORY AND PHYSICAL UPDATE ADMISSION EXAM    Name: Praveena Hernandez  YOB: 1990  Medical Record Number: [id number]    History of Present Illness: Praveena Hernandez is a 33 year old female who is 37w5d pregnant and being admitted for induction of labor, indication gHTN. Supported by her partner, Rosendo. Was seen in clinic today with diagnosis of gHTN, BPP at that time was .     Last growth US: 34w 98.7%, AC > 99%, 7lb1oz    Estimated Date of Delivery: 2024    EGA 37w5d    OB History    Para Term  AB Living   1 0 0 0 0 0   SAB IAB Ectopic Multiple Live Births   0 0 0 0 0      # Outcome Date GA Lbr Fransisco/2nd Weight Sex Type Anes PTL Lv   1 Current                 Lab Results   Component Value Date    AS Negative 2024    HEPBANG Nonreactive 2023    HGB 11.7 2024       Prenatal Complications:   1) Pre pregnancy BMI >40  2) Borderline pre diabetes, A1c 5.4. Failed early 1hr GCT, passed 3hr GTT x 2  3) Migraines without aura  4) Asthma  5) Suspected macrosomia  6) gHTN with pre eclampsia diagnosed on admission    Exam:      /76   Pulse (!) 121   Temp 98.6  F (37  C) (Oral)   Resp 18   LMP 2023   SpO2 98%     Fetal heart Rate Category 1  Contractions irregular    HEENT grossly normal  Neck: no lymphadenopathy or thryoidomegaly  Lungs CTAB  Heart RRR  ABD gravid, non-tender  EXT:  NO edema, moves freely  Vaginal exam Closed/long/high  Membranes: intact    Assessment: induction of labor, indication gHTN with pre eclampsia without severe features diagnosed on admission  GBS negative  O positive      Plan: Admit - see IP orders  HTN orderset placed. Reviewed pre e diagnosis with patient and her partner. PCR 0.31. No severe range BP.   Cervical ripening with misoprostol  Pain medication as desired. She is planning to use N2O but open to an epidural if she needs it.   Theraputic sleep PRN  Previously counseled on risk of shoulder dystocia by MD in clinic.    Anticipate   Active management of third stage    Prenatal record reviewed.    CECILIA Burnett Dr. aware of patient status and remains available for consultation and collaboration as needed.    2024   10:51 PM

## 2024-05-23 NOTE — PROGRESS NOTES
Pt tolerating Oral Cytotec well. States minimal contractions. Pt complains more of back discomfort from being in bed. Flexeril given. Encouraged to be out of bed as much as possible and to move about room. Pt ambulating in room and in halls on and off. Emy monitor working well. Needing some attention at times to  FHR/Contractions.   VSS.   PLAN: Will continue with cervical ripening per orders.

## 2024-05-23 NOTE — PLAN OF CARE
"  Problem: Adult Inpatient Plan of Care  Goal: Plan of Care Review  Description: The Plan of Care Review/Shift note should be completed every shift.  The Outcome Evaluation is a brief statement about your assessment that the patient is improving, declining, or no change.  This information will be displayed automatically on your shift  note.  Outcome: Progressing  Goal: Patient-Specific Goal (Individualized)  Description: You can add care plan individualizations to a care plan. Examples of Individualization might be:  \"Parent requests to be called daily at 9am for status\", \"I have a hard time hearing out of my right ear\", or \"Do not touch me to wake me up as it startles  me\".  Outcome: Progressing  Goal: Absence of Hospital-Acquired Illness or Injury  Outcome: Progressing  Goal: Optimal Comfort and Wellbeing  Outcome: Progressing  Goal: Readiness for Transition of Care  Outcome: Progressing     Problem: Labor  Goal: Hemostasis  Outcome: Progressing  Goal: Stable Fetal Wellbeing  Outcome: Progressing  Goal: Effective Progression to Delivery  Outcome: Progressing  Goal: Absence of Infection Signs and Symptoms  Outcome: Progressing  Goal: Acceptable Pain Control  Outcome: Progressing  Goal: Normal Uterine Contraction Pattern  Outcome: Progressing     Problem: Hypertensive Disorders in Pregnancy  Goal: Patient-Fetal Stabilization  Outcome: Progressing   Goal Outcome Evaluation:                        "

## 2024-05-24 PROCEDURE — 999N000248 HC STATISTIC IV INSERT WITH US BY RN

## 2024-05-24 PROCEDURE — 250N000013 HC RX MED GY IP 250 OP 250 PS 637: Performed by: ADVANCED PRACTICE MIDWIFE

## 2024-05-24 PROCEDURE — 250N000013 HC RX MED GY IP 250 OP 250 PS 637: Performed by: REGISTERED NURSE

## 2024-05-24 PROCEDURE — 120N000001 HC R&B MED SURG/OB

## 2024-05-24 PROCEDURE — 250N000011 HC RX IP 250 OP 636: Performed by: REGISTERED NURSE

## 2024-05-24 PROCEDURE — 3E0P7VZ INTRODUCTION OF HORMONE INTO FEMALE REPRODUCTIVE, VIA NATURAL OR ARTIFICIAL OPENING: ICD-10-PCS | Performed by: ADVANCED PRACTICE MIDWIFE

## 2024-05-24 RX ORDER — LIDOCAINE 40 MG/G
CREAM TOPICAL
Status: CANCELLED | OUTPATIENT
Start: 2024-05-24

## 2024-05-24 RX ORDER — MISOPROSTOL 100 UG/1
25 TABLET ORAL
Status: DISCONTINUED | OUTPATIENT
Start: 2024-05-24 | End: 2024-05-25

## 2024-05-24 RX ORDER — CYCLOBENZAPRINE HCL 10 MG
10 TABLET ORAL 3 TIMES DAILY PRN
Status: DISCONTINUED | OUTPATIENT
Start: 2024-05-24 | End: 2024-05-24

## 2024-05-24 RX ORDER — SODIUM CHLORIDE, SODIUM LACTATE, POTASSIUM CHLORIDE, CALCIUM CHLORIDE 600; 310; 30; 20 MG/100ML; MG/100ML; MG/100ML; MG/100ML
10-125 INJECTION, SOLUTION INTRAVENOUS CONTINUOUS
Status: CANCELLED | OUTPATIENT
Start: 2024-05-24

## 2024-05-24 RX ORDER — CYCLOBENZAPRINE HCL 5 MG
5 TABLET ORAL 3 TIMES DAILY PRN
Status: DISCONTINUED | OUTPATIENT
Start: 2024-05-24 | End: 2024-05-27 | Stop reason: HOSPADM

## 2024-05-24 RX ADMIN — MISOPROSTOL 25 MCG: 100 TABLET ORAL at 20:09

## 2024-05-24 RX ADMIN — CYCLOBENZAPRINE HYDROCHLORIDE 5 MG: 5 TABLET, FILM COATED ORAL at 22:24

## 2024-05-24 RX ADMIN — DINOPROSTONE 10 MG: 10 INSERT VAGINAL at 02:25

## 2024-05-24 RX ADMIN — ACETAMINOPHEN 975 MG: 325 TABLET ORAL at 16:31

## 2024-05-24 RX ADMIN — HYDROXYZINE HYDROCHLORIDE 100 MG: 50 TABLET, FILM COATED ORAL at 02:31

## 2024-05-24 RX ADMIN — CYCLOBENZAPRINE HYDROCHLORIDE 5 MG: 5 TABLET, FILM COATED ORAL at 07:57

## 2024-05-24 RX ADMIN — CYCLOBENZAPRINE HYDROCHLORIDE 5 MG: 5 TABLET, FILM COATED ORAL at 16:31

## 2024-05-24 RX ADMIN — MISOPROSTOL 25 MCG: 100 TABLET ORAL at 16:02

## 2024-05-24 RX ADMIN — ACETAMINOPHEN 975 MG: 325 TABLET ORAL at 07:30

## 2024-05-24 RX ADMIN — ACETAMINOPHEN 975 MG: 325 TABLET ORAL at 22:24

## 2024-05-24 ASSESSMENT — ACTIVITIES OF DAILY LIVING (ADL)
ADLS_ACUITY_SCORE: 18

## 2024-05-24 NOTE — PLAN OF CARE
Problem: Adult Inpatient Plan of Care  Goal: Plan of Care Review  Description: The Plan of Care Review/Shift note should be completed every shift.  The Outcome Evaluation is a brief statement about your assessment that the patient is improving, declining, or no change.  This information will be displayed automatically on your shift  note.  Outcome: Progressing     Problem: Adult Inpatient Plan of Care  Goal: Optimal Comfort and Wellbeing  Outcome: Progressing  Intervention: Monitor Pain and Promote Comfort  Recent Flowsheet Documentation  Taken 5/23/2024 2120 by Asya Bangura RN  Pain Management Interventions:   care clustered   medication (see MAR)   pain management plan reviewed with patient/caregiver   pillow support provided   quiet environment facilitated   relaxation techniques promoted   repositioned   rest  Intervention: Provide Person-Centered Care  Recent Flowsheet Documentation  Taken 5/23/2024 2109 by Asya Bangura RN  Trust Relationship/Rapport:   care explained   choices provided   emotional support provided   empathic listening provided   questions answered   questions encouraged   reassurance provided   thoughts/feelings acknowledged     Problem: Labor  Goal: Stable Fetal Wellbeing  Outcome: Progressing  Intervention: Promote and Monitor Fetal Wellbeing  Recent Flowsheet Documentation  Taken 5/23/2024 2120 by Asya Bangura RN  Body Position: position changed independently  Taken 5/23/2024 2109 by Asya Bangura RN  Fetal Wellbeing Promotion:   fetal heart rate monitored   intake and output monitored   maternal position adjusted   uterine contraction activity assessed     Problem: Hypertensive Disorders in Pregnancy  Goal: Patient-Fetal Stabilization  Outcome: Progressing  Intervention: Optimize Blood Pressure and Fluid Status  Recent Flowsheet Documentation  Taken 5/23/2024 2109 by Asya Bangura RN  Fetal Wellbeing Promotion:   fetal heart rate monitored   intake and output monitored    maternal position adjusted   uterine contraction activity assessed  Fluid/Electrolyte Management: fluids provided   VSS,  category 1, Emy has signal issues resulting in erroneous readings and artifact. Pt cont to have c/o back pain and has some relief with current plan used. Partner is present and supportive. ANDRE Stephens called earlier re: medication management for pain and sleep. Stable.

## 2024-05-24 NOTE — PROGRESS NOTES
"Patient Name:  Praveena Hernandez  :      1990  MRN:      [id number]    Assessment:     at 38w0d  Induction of labor  Category 1 FHTs  Intact  S/P 12 doses oral cytotec  S/P 12h cervidil  Preeclampsia without severe features      Plan:   -Discussed options for continued cervical ripening. Consents to proceeding with vaginal cytotec and HELLP panel WNL. Assessment as below, no severe range BP noted. Continue to monitor for signs of worsening BP mediated changes or signs of pre eclampsia.   -Routine support & management. Encourage position changes, ambulation as appropriate, rest as desired.  -Anticipate progress and NSVB.   -Reevaluate progress in 24 hours or sooner with a change in status.     Subjective:  Praveena Hernandez is coping well with cramping. Using non pharmacologic  for pain relief. Good PO fluid intake.   Voiding without issue. Family supportive at bedside.       Objective:  BP (!) 143/76   Pulse 108   Temp 98.9  F (37.2  C) (Oral)   Resp 18   Ht 1.6 m (5' 3\")   Wt 117 kg (258 lb)   LMP 2023   SpO2 98%   BMI 45.70 kg/m      FHR:Baseline: 145 bpm, Variability: Moderate (6 - 25 bpm), Accelerations: present and Decelerations: Absent    Uterine contractions:TocoFrequency: Every 2-4 minutes, Duration: 60 seconds and Intensity: mild    SVE:FT/high, unable to reach through cervix    Provider: JAS Paredes CNM      Date:  2024  Time:  3:56 PM  "

## 2024-05-24 NOTE — PROGRESS NOTES
Lory Mena in department. Discussed latest SVE and cervadil removal. Reviewed FHT monitoring strip.   Plan to be off monitors to shower and will assess for next steps in plan of care.

## 2024-05-24 NOTE — PLAN OF CARE
Problem: Adult Inpatient Plan of Care  Goal: Optimal Comfort and Wellbeing  5/24/2024 0319 by Asya Bangura RN  Outcome: Progressing  5/23/2024 2231 by Asya Bangura RN  Outcome: Progressing  Intervention: Monitor Pain and Promote Comfort  Recent Flowsheet Documentation  Taken 5/24/2024 0612 by Asya Bangura RN  Pain Management Interventions:   care clustered   pillow support provided   relaxation techniques promoted   repositioned   rest  Taken 5/24/2024 0231 by Asya Bangura RN  Pain Management Interventions:   care clustered   aromatherapy   pillow support provided   quiet environment facilitated   relaxation techniques promoted   repositioned   rest   heat applied   cold applied  Taken 5/23/2024 2342 by sAya Bangura RN  Pain Management Interventions:   care clustered   pillow support provided   quiet environment facilitated   relaxation techniques promoted   rest   repositioned  Taken 5/23/2024 2221 by Asya Bangura RN  Pain Management Interventions:   care clustered   cold applied   heat applied   pillow support provided   quiet environment facilitated   relaxation techniques promoted   repositioned   rest  Taken 5/23/2024 2120 by Asya Bangura RN  Pain Management Interventions:   care clustered   medication (see MAR)   pain management plan reviewed with patient/caregiver   pillow support provided   quiet environment facilitated   relaxation techniques promoted   repositioned   rest  Intervention: Provide Person-Centered Care  Recent Flowsheet Documentation  Taken 5/24/2024 0207 by Asya Bangura RN  Trust Relationship/Rapport:   care explained   choices provided   emotional support provided   empathic listening provided   questions answered   questions encouraged   reassurance provided   thoughts/feelings acknowledged  Taken 5/23/2024 2109 by Asya Bangura RN  Trust Relationship/Rapport:   care explained   choices provided   emotional support provided   empathic listening provided    questions answered   questions encouraged   reassurance provided   thoughts/feelings acknowledged     Problem: Adult Inpatient Plan of Care  Goal: Optimal Comfort and Wellbeing  Intervention: Provide Person-Centered Care  Recent Flowsheet Documentation  Taken 5/24/2024 0207 by Asya Bangura RN  Trust Relationship/Rapport:   care explained   choices provided   emotional support provided   empathic listening provided   questions answered   questions encouraged   reassurance provided   thoughts/feelings acknowledged  Taken 5/23/2024 2109 by Asya Bangura RN  Trust Relationship/Rapport:   care explained   choices provided   emotional support provided   empathic listening provided   questions answered   questions encouraged   reassurance provided   thoughts/feelings acknowledged     Problem: Labor  Goal: Stable Fetal Wellbeing  5/24/2024 0319 by Asya Bangura RN  Outcome: Progressing  5/23/2024 2231 by Asya Bangura RN  Outcome: Progressing  Intervention: Promote and Monitor Fetal Wellbeing  Recent Flowsheet Documentation  Taken 5/24/2024 0207 by Asya Bangura RN  Body Position: position changed independently  Fetal Wellbeing Promotion:   fetal heart rate monitored   intake and output monitored   maternal position adjusted   uterine contraction activity assessed  Taken 5/23/2024 2120 by Asya Bangura RN  Body Position: position changed independently  Taken 5/23/2024 2109 by Asya Bangura RN  Fetal Wellbeing Promotion:   fetal heart rate monitored   intake and output monitored   maternal position adjusted   uterine contraction activity assessed     Problem: Hypertensive Disorders in Pregnancy  Goal: Patient-Fetal Stabilization  Intervention: Optimize Blood Pressure and Fluid Status  Recent Flowsheet Documentation  Taken 5/24/2024 0207 by Asya Bangura RN  Fetal Wellbeing Promotion:   fetal heart rate monitored   intake and output monitored   maternal position adjusted   uterine contraction activity  assessed  Fluid/Electrolyte Management: fluids provided  Taken 5/23/2024 2109 by Asya Bangura, RN  Fetal Wellbeing Promotion:   fetal heart rate monitored   intake and output monitored   maternal position adjusted   uterine contraction activity assessed  Fluid/Electrolyte Management: fluids provided   VSS, , category 1 tracing. Emy used and has signal issues resulting in erroneous tracings. Pt c/o back pain with improved pain relief with current routines. Cervidil was placed at 0225. States sleep is much better today. Partner and family are supportive. Stable, NAD.

## 2024-05-25 ENCOUNTER — ANESTHESIA (OUTPATIENT)
Dept: OBGYN | Facility: HOSPITAL | Age: 34
End: 2024-05-25
Payer: COMMERCIAL

## 2024-05-25 ENCOUNTER — ANESTHESIA EVENT (OUTPATIENT)
Dept: OBGYN | Facility: HOSPITAL | Age: 34
End: 2024-05-25
Payer: COMMERCIAL

## 2024-05-25 LAB
APTT PPP: 33 SECONDS (ref 22–38)
HOLD SPECIMEN: NORMAL
HOLD SPECIMEN: NORMAL
INR PPP: 0.95 (ref 0.85–1.15)
PLATELET # BLD AUTO: 212 10E3/UL (ref 150–450)

## 2024-05-25 PROCEDURE — 360N000076 HC SURGERY LEVEL 3, PER MIN: Performed by: OBSTETRICS & GYNECOLOGY

## 2024-05-25 PROCEDURE — 250N000009 HC RX 250: Performed by: ADVANCED PRACTICE MIDWIFE

## 2024-05-25 PROCEDURE — 250N000013 HC RX MED GY IP 250 OP 250 PS 637: Performed by: REGISTERED NURSE

## 2024-05-25 PROCEDURE — 250N000013 HC RX MED GY IP 250 OP 250 PS 637: Performed by: ADVANCED PRACTICE MIDWIFE

## 2024-05-25 PROCEDURE — 250N000011 HC RX IP 250 OP 636: Performed by: ADVANCED PRACTICE MIDWIFE

## 2024-05-25 PROCEDURE — 250N000011 HC RX IP 250 OP 636: Performed by: ANESTHESIOLOGY

## 2024-05-25 PROCEDURE — 370N000017 HC ANESTHESIA TECHNICAL FEE, PER MIN: Performed by: OBSTETRICS & GYNECOLOGY

## 2024-05-25 PROCEDURE — 258N000003 HC RX IP 258 OP 636: Performed by: ANESTHESIOLOGY

## 2024-05-25 PROCEDURE — 120N000001 HC R&B MED SURG/OB

## 2024-05-25 PROCEDURE — 36415 COLL VENOUS BLD VENIPUNCTURE: CPT | Performed by: ANESTHESIOLOGY

## 2024-05-25 PROCEDURE — 272N000001 HC OR GENERAL SUPPLY STERILE: Performed by: OBSTETRICS & GYNECOLOGY

## 2024-05-25 PROCEDURE — 250N000009 HC RX 250: Performed by: REGISTERED NURSE

## 2024-05-25 PROCEDURE — 85610 PROTHROMBIN TIME: CPT | Performed by: ANESTHESIOLOGY

## 2024-05-25 PROCEDURE — 85049 AUTOMATED PLATELET COUNT: CPT | Performed by: ANESTHESIOLOGY

## 2024-05-25 PROCEDURE — 999N000249 HC STATISTIC C-SECTION ON UNIT

## 2024-05-25 PROCEDURE — 258N000003 HC RX IP 258 OP 636: Performed by: ADVANCED PRACTICE MIDWIFE

## 2024-05-25 PROCEDURE — 250N000011 HC RX IP 250 OP 636: Performed by: REGISTERED NURSE

## 2024-05-25 PROCEDURE — 85730 THROMBOPLASTIN TIME PARTIAL: CPT | Performed by: ANESTHESIOLOGY

## 2024-05-25 PROCEDURE — 250N000009 HC RX 250: Performed by: ANESTHESIOLOGY

## 2024-05-25 PROCEDURE — 999N000249 HC STATISTIC C-SECTION ON UNIT: Performed by: OBSTETRICS & GYNECOLOGY

## 2024-05-25 PROCEDURE — 258N000003 HC RX IP 258 OP 636: Performed by: REGISTERED NURSE

## 2024-05-25 RX ORDER — LIDOCAINE HCL/EPINEPHRINE/PF 2%-1:200K
VIAL (ML) INJECTION PRN
Status: DISCONTINUED | OUTPATIENT
Start: 2024-05-25 | End: 2024-05-25

## 2024-05-25 RX ORDER — DEXAMETHASONE SODIUM PHOSPHATE 10 MG/ML
INJECTION, SOLUTION INTRAMUSCULAR; INTRAVENOUS PRN
Status: DISCONTINUED | OUTPATIENT
Start: 2024-05-25 | End: 2024-05-25

## 2024-05-25 RX ORDER — SODIUM CHLORIDE, SODIUM LACTATE, POTASSIUM CHLORIDE, CALCIUM CHLORIDE 600; 310; 30; 20 MG/100ML; MG/100ML; MG/100ML; MG/100ML
INJECTION, SOLUTION INTRAVENOUS CONTINUOUS PRN
Status: DISCONTINUED | OUTPATIENT
Start: 2024-05-25 | End: 2024-05-25

## 2024-05-25 RX ORDER — CARBOPROST TROMETHAMINE 250 UG/ML
250 INJECTION, SOLUTION INTRAMUSCULAR
Status: DISCONTINUED | OUTPATIENT
Start: 2024-05-25 | End: 2024-05-26 | Stop reason: HOSPADM

## 2024-05-25 RX ORDER — SODIUM CHLORIDE, SODIUM LACTATE, POTASSIUM CHLORIDE, CALCIUM CHLORIDE 600; 310; 30; 20 MG/100ML; MG/100ML; MG/100ML; MG/100ML
INJECTION, SOLUTION INTRAVENOUS CONTINUOUS
Status: DISCONTINUED | OUTPATIENT
Start: 2024-05-25 | End: 2024-05-26 | Stop reason: HOSPADM

## 2024-05-25 RX ORDER — EPHEDRINE SULFATE 50 MG/ML
5 INJECTION, SOLUTION INTRAMUSCULAR; INTRAVENOUS; SUBCUTANEOUS
Status: DISCONTINUED | OUTPATIENT
Start: 2024-05-25 | End: 2024-05-26

## 2024-05-25 RX ORDER — ONDANSETRON 2 MG/ML
4 INJECTION INTRAMUSCULAR; INTRAVENOUS EVERY 6 HOURS PRN
Status: DISCONTINUED | OUTPATIENT
Start: 2024-05-25 | End: 2024-05-26

## 2024-05-25 RX ORDER — OXYTOCIN/0.9 % SODIUM CHLORIDE 30/500 ML
100-340 PLASTIC BAG, INJECTION (ML) INTRAVENOUS CONTINUOUS PRN
Status: CANCELLED | OUTPATIENT
Start: 2024-05-25

## 2024-05-25 RX ORDER — MORPHINE SULFATE 1 MG/ML
INJECTION, SOLUTION EPIDURAL; INTRATHECAL; INTRAVENOUS PRN
Status: DISCONTINUED | OUTPATIENT
Start: 2024-05-25 | End: 2024-05-25

## 2024-05-25 RX ORDER — MISOPROSTOL 200 UG/1
800 TABLET ORAL
Status: DISCONTINUED | OUTPATIENT
Start: 2024-05-25 | End: 2024-05-26 | Stop reason: HOSPADM

## 2024-05-25 RX ORDER — LOPERAMIDE HCL 2 MG
4 CAPSULE ORAL
Status: DISCONTINUED | OUTPATIENT
Start: 2024-05-25 | End: 2024-05-26 | Stop reason: HOSPADM

## 2024-05-25 RX ORDER — ONDANSETRON 2 MG/ML
INJECTION INTRAMUSCULAR; INTRAVENOUS PRN
Status: DISCONTINUED | OUTPATIENT
Start: 2024-05-25 | End: 2024-05-25

## 2024-05-25 RX ORDER — LIDOCAINE 40 MG/G
CREAM TOPICAL
Status: DISCONTINUED | OUTPATIENT
Start: 2024-05-25 | End: 2024-05-25

## 2024-05-25 RX ORDER — DIPHENHYDRAMINE HCL 25 MG
25 CAPSULE ORAL EVERY 6 HOURS PRN
Status: DISCONTINUED | OUTPATIENT
Start: 2024-05-25 | End: 2024-05-27 | Stop reason: HOSPADM

## 2024-05-25 RX ORDER — OXYTOCIN 10 [USP'U]/ML
10 INJECTION, SOLUTION INTRAMUSCULAR; INTRAVENOUS
Status: DISCONTINUED | OUTPATIENT
Start: 2024-05-25 | End: 2024-05-26 | Stop reason: HOSPADM

## 2024-05-25 RX ORDER — BUPIVACAINE HYDROCHLORIDE 2.5 MG/ML
INJECTION, SOLUTION EPIDURAL; INFILTRATION; INTRACAUDAL
Status: COMPLETED | OUTPATIENT
Start: 2024-05-25 | End: 2024-05-25

## 2024-05-25 RX ORDER — ONDANSETRON 4 MG/1
4 TABLET, ORALLY DISINTEGRATING ORAL EVERY 6 HOURS PRN
Status: DISCONTINUED | OUTPATIENT
Start: 2024-05-25 | End: 2024-05-26

## 2024-05-25 RX ORDER — SODIUM CHLORIDE, SODIUM LACTATE, POTASSIUM CHLORIDE, CALCIUM CHLORIDE 600; 310; 30; 20 MG/100ML; MG/100ML; MG/100ML; MG/100ML
INJECTION, SOLUTION INTRAVENOUS CONTINUOUS PRN
Status: DISCONTINUED | OUTPATIENT
Start: 2024-05-25 | End: 2024-05-26

## 2024-05-25 RX ORDER — LIDOCAINE 40 MG/G
CREAM TOPICAL
Status: DISCONTINUED | OUTPATIENT
Start: 2024-05-25 | End: 2024-05-26 | Stop reason: HOSPADM

## 2024-05-25 RX ORDER — OXYTOCIN/0.9 % SODIUM CHLORIDE 30/500 ML
1-24 PLASTIC BAG, INJECTION (ML) INTRAVENOUS CONTINUOUS
Status: DISCONTINUED | OUTPATIENT
Start: 2024-05-25 | End: 2024-05-25

## 2024-05-25 RX ORDER — MISOPROSTOL 200 UG/1
400 TABLET ORAL
Status: DISCONTINUED | OUTPATIENT
Start: 2024-05-25 | End: 2024-05-26 | Stop reason: HOSPADM

## 2024-05-25 RX ORDER — OXYTOCIN/0.9 % SODIUM CHLORIDE 30/500 ML
340 PLASTIC BAG, INJECTION (ML) INTRAVENOUS CONTINUOUS PRN
Status: DISCONTINUED | OUTPATIENT
Start: 2024-05-25 | End: 2024-05-26 | Stop reason: HOSPADM

## 2024-05-25 RX ORDER — CITRIC ACID/SODIUM CITRATE 334-500MG
30 SOLUTION, ORAL ORAL
Status: COMPLETED | OUTPATIENT
Start: 2024-05-25 | End: 2024-05-25

## 2024-05-25 RX ORDER — CEFAZOLIN SODIUM/WATER 2 G/20 ML
2 SYRINGE (ML) INTRAVENOUS SEE ADMIN INSTRUCTIONS
Status: DISCONTINUED | OUTPATIENT
Start: 2024-05-25 | End: 2024-05-26 | Stop reason: HOSPADM

## 2024-05-25 RX ORDER — TRANEXAMIC ACID 10 MG/ML
1 INJECTION, SOLUTION INTRAVENOUS EVERY 30 MIN PRN
Status: DISCONTINUED | OUTPATIENT
Start: 2024-05-25 | End: 2024-05-26 | Stop reason: HOSPADM

## 2024-05-25 RX ORDER — METHYLERGONOVINE MALEATE 0.2 MG/ML
200 INJECTION INTRAVENOUS
Status: DISCONTINUED | OUTPATIENT
Start: 2024-05-25 | End: 2024-05-26 | Stop reason: HOSPADM

## 2024-05-25 RX ORDER — DIPHENHYDRAMINE HYDROCHLORIDE 50 MG/ML
25 INJECTION INTRAMUSCULAR; INTRAVENOUS EVERY 6 HOURS PRN
Status: DISCONTINUED | OUTPATIENT
Start: 2024-05-25 | End: 2024-05-27 | Stop reason: HOSPADM

## 2024-05-25 RX ORDER — TERBUTALINE SULFATE 1 MG/ML
0.25 INJECTION, SOLUTION SUBCUTANEOUS
Status: DISCONTINUED | OUTPATIENT
Start: 2024-05-25 | End: 2024-05-25

## 2024-05-25 RX ORDER — LIDOCAINE HYDROCHLORIDE AND EPINEPHRINE 15; 5 MG/ML; UG/ML
3 INJECTION, SOLUTION EPIDURAL
Status: DISCONTINUED | OUTPATIENT
Start: 2024-05-25 | End: 2024-05-26

## 2024-05-25 RX ORDER — FENTANYL CITRATE-0.9 % NACL/PF 10 MCG/ML
100 PLASTIC BAG, INJECTION (ML) INTRAVENOUS EVERY 5 MIN PRN
Status: DISCONTINUED | OUTPATIENT
Start: 2024-05-25 | End: 2024-05-26

## 2024-05-25 RX ORDER — BUPIVACAINE HYDROCHLORIDE 2.5 MG/ML
8 INJECTION, SOLUTION EPIDURAL; INFILTRATION; INTRACAUDAL ONCE
Status: DISCONTINUED | OUTPATIENT
Start: 2024-05-25 | End: 2024-05-25

## 2024-05-25 RX ORDER — ACETAMINOPHEN 325 MG/1
975 TABLET ORAL ONCE
Status: COMPLETED | OUTPATIENT
Start: 2024-05-25 | End: 2024-05-25

## 2024-05-25 RX ORDER — CEFAZOLIN SODIUM/WATER 2 G/20 ML
2 SYRINGE (ML) INTRAVENOUS
Status: COMPLETED | OUTPATIENT
Start: 2024-05-25 | End: 2024-05-25

## 2024-05-25 RX ORDER — OXYTOCIN/0.9 % SODIUM CHLORIDE 30/500 ML
1-24 PLASTIC BAG, INJECTION (ML) INTRAVENOUS CONTINUOUS
Status: DISCONTINUED | OUTPATIENT
Start: 2024-05-25 | End: 2024-05-26

## 2024-05-25 RX ORDER — OXYTOCIN 10 [USP'U]/ML
10 INJECTION, SOLUTION INTRAMUSCULAR; INTRAVENOUS
Status: CANCELLED | OUTPATIENT
Start: 2024-05-25

## 2024-05-25 RX ORDER — TERBUTALINE SULFATE 1 MG/ML
0.25 INJECTION, SOLUTION SUBCUTANEOUS
Status: DISCONTINUED | OUTPATIENT
Start: 2024-05-25 | End: 2024-05-26

## 2024-05-25 RX ORDER — FENTANYL/ROPIVACAINE/NS/PF 2MCG/ML-.1
PLASTIC BAG, INJECTION (ML) EPIDURAL
Status: DISCONTINUED | OUTPATIENT
Start: 2024-05-25 | End: 2024-05-26

## 2024-05-25 RX ORDER — NALBUPHINE HYDROCHLORIDE 20 MG/ML
2.5-5 INJECTION, SOLUTION INTRAMUSCULAR; INTRAVENOUS; SUBCUTANEOUS EVERY 6 HOURS PRN
Status: DISCONTINUED | OUTPATIENT
Start: 2024-05-25 | End: 2024-05-27 | Stop reason: HOSPADM

## 2024-05-25 RX ORDER — LOPERAMIDE HCL 2 MG
2 CAPSULE ORAL
Status: DISCONTINUED | OUTPATIENT
Start: 2024-05-25 | End: 2024-05-26 | Stop reason: HOSPADM

## 2024-05-25 RX ADMIN — PHENYLEPHRINE HYDROCHLORIDE 0.2 MCG/KG/MIN: 10 INJECTION INTRAVENOUS at 22:38

## 2024-05-25 RX ADMIN — MISOPROSTOL 25 MCG: 100 TABLET ORAL at 04:08

## 2024-05-25 RX ADMIN — MORPHINE SULFATE 15 MG: 10 INJECTION, SOLUTION INTRAMUSCULAR; INTRAVENOUS at 00:22

## 2024-05-25 RX ADMIN — SODIUM CHLORIDE, POTASSIUM CHLORIDE, SODIUM LACTATE AND CALCIUM CHLORIDE: 600; 310; 30; 20 INJECTION, SOLUTION INTRAVENOUS at 22:34

## 2024-05-25 RX ADMIN — LIDOCAINE HYDROCHLORIDE,EPINEPHRINE BITARTRATE 8 ML: 20; .005 INJECTION, SOLUTION EPIDURAL; INFILTRATION; INTRACAUDAL; PERINEURAL at 22:28

## 2024-05-25 RX ADMIN — MISOPROSTOL 25 MCG: 100 TABLET ORAL at 08:09

## 2024-05-25 RX ADMIN — MISOPROSTOL 25 MCG: 100 TABLET ORAL at 00:10

## 2024-05-25 RX ADMIN — Medication: at 13:40

## 2024-05-25 RX ADMIN — BUPIVACAINE HYDROCHLORIDE 5 ML: 2.5 INJECTION, SOLUTION EPIDURAL; INFILTRATION; INTRACAUDAL at 13:44

## 2024-05-25 RX ADMIN — Medication 340 ML/HR: at 22:58

## 2024-05-25 RX ADMIN — PHENYLEPHRINE HYDROCHLORIDE 200 MCG: 10 INJECTION INTRAVENOUS at 22:46

## 2024-05-25 RX ADMIN — SODIUM CHLORIDE, POTASSIUM CHLORIDE, SODIUM LACTATE AND CALCIUM CHLORIDE 250 ML: 600; 310; 30; 20 INJECTION, SOLUTION INTRAVENOUS at 16:37

## 2024-05-25 RX ADMIN — ACETAMINOPHEN 975 MG: 325 TABLET ORAL at 22:15

## 2024-05-25 RX ADMIN — FENTANYL CITRATE 100 MCG: 50 INJECTION, SOLUTION INTRAMUSCULAR; INTRAVENOUS at 12:28

## 2024-05-25 RX ADMIN — SODIUM CHLORIDE, POTASSIUM CHLORIDE, SODIUM LACTATE AND CALCIUM CHLORIDE 1000 ML: 600; 310; 30; 20 INJECTION, SOLUTION INTRAVENOUS at 12:16

## 2024-05-25 RX ADMIN — Medication 2 G: at 22:44

## 2024-05-25 RX ADMIN — DEXAMETHASONE SODIUM PHOSPHATE 10 MG: 10 INJECTION, SOLUTION INTRAMUSCULAR; INTRAVENOUS at 22:46

## 2024-05-25 RX ADMIN — PHENYLEPHRINE HYDROCHLORIDE 200 MCG: 10 INJECTION INTRAVENOUS at 23:16

## 2024-05-25 RX ADMIN — Medication 2 MILLI-UNITS/MIN: at 16:06

## 2024-05-25 RX ADMIN — ONDANSETRON 4 MG: 2 INJECTION INTRAMUSCULAR; INTRAVENOUS at 10:55

## 2024-05-25 RX ADMIN — Medication 2 MG: at 22:58

## 2024-05-25 RX ADMIN — DOXYLAMINE SUCCINATE 25 MG: 25 TABLET ORAL at 04:18

## 2024-05-25 RX ADMIN — HYDROXYZINE HYDROCHLORIDE 100 MG: 50 TABLET, FILM COATED ORAL at 04:17

## 2024-05-25 RX ADMIN — CEFAZOLIN 2 G: 10 INJECTION, POWDER, FOR SOLUTION INTRAVENOUS at 22:25

## 2024-05-25 RX ADMIN — ONDANSETRON 4 MG: 2 INJECTION INTRAMUSCULAR; INTRAVENOUS at 22:46

## 2024-05-25 RX ADMIN — SODIUM CITRATE AND CITRIC ACID MONOHYDRATE 30 ML: 500; 334 SOLUTION ORAL at 22:15

## 2024-05-25 RX ADMIN — HYDROXYZINE HYDROCHLORIDE 100 MG: 50 TABLET, FILM COATED ORAL at 00:07

## 2024-05-25 RX ADMIN — AZITHROMYCIN MONOHYDRATE 500 MG: 500 INJECTION, POWDER, LYOPHILIZED, FOR SOLUTION INTRAVENOUS at 22:41

## 2024-05-25 RX ADMIN — PHENYLEPHRINE HYDROCHLORIDE 200 MCG: 10 INJECTION INTRAVENOUS at 22:42

## 2024-05-25 RX ADMIN — METOCLOPRAMIDE 10 MG: 10 TABLET ORAL at 00:16

## 2024-05-25 RX ADMIN — LIDOCAINE HYDROCHLORIDE,EPINEPHRINE BITARTRATE 10 ML: 20; .005 INJECTION, SOLUTION EPIDURAL; INFILTRATION; INTRACAUDAL; PERINEURAL at 22:14

## 2024-05-25 ASSESSMENT — ACTIVITIES OF DAILY LIVING (ADL)
ADLS_ACUITY_SCORE: 18
ADLS_ACUITY_SCORE: 18
ADLS_ACUITY_SCORE: 19
ADLS_ACUITY_SCORE: 18
ADLS_ACUITY_SCORE: 19
ADLS_ACUITY_SCORE: 18

## 2024-05-25 ASSESSMENT — LIFESTYLE VARIABLES: TOBACCO_USE: 0

## 2024-05-25 NOTE — PROGRESS NOTES
Praveena has been uncomfortable when in bed and has been on birthing ball and in chair. Baby is hard to keep on monitor in any other position except when pt is in bed. Pad is on bed for comfort and pts spouse has purchased a foam topper for bed. She also uses lots of pillows and requires assistance when repositioning. Had some nausea and received Reglan. Got Morphine and vistaril for sleep. Spouse went home overnight.

## 2024-05-25 NOTE — PLAN OF CARE
Problem: Labor  Goal: Stable Fetal Wellbeing  Outcome: Progressing  Intervention: Promote and Monitor Fetal Wellbeing  Recent Flowsheet Documentation  Taken 5/25/2024 0005 by Mely Meyers RN  Fetal Wellbeing Promotion:   fetal heart rate monitored   fetal heart tones checked   intake and output monitored   maternal position adjusted   uterine contraction activity assessed  Taken 5/24/2024 1934 by Mely Meyers RN  Fetal Wellbeing Promotion:   fetal heart rate monitored   fetal heart tones checked   intake and output monitored   maternal position adjusted   uterine contraction activity assessed     Problem: Labor  Goal: Effective Progression to Delivery  Outcome: Progressing     Problem: Labor  Goal: Absence of Infection Signs and Symptoms  Outcome: Progressing  Intervention: Prevent or Manage Infection  Recent Flowsheet Documentation  Taken 5/25/2024 0005 by Mely Meyers RN  Infection Prevention: hand hygiene promoted  Taken 5/24/2024 1934 by Mely Meyers RN  Infection Prevention: hand hygiene promoted     Problem: Labor  Goal: Absence of Infection Signs and Symptoms  Intervention: Prevent or Manage Infection  Recent Flowsheet Documentation  Taken 5/25/2024 0005 by Mely Meyers RN  Infection Prevention: hand hygiene promoted  Taken 5/24/2024 1934 by Mely Meyers RN  Infection Prevention: hand hygiene promoted     Problem: Labor  Goal: Absence of Infection Signs and Symptoms  Intervention: Prevent or Manage Infection  Recent Flowsheet Documentation  Taken 5/25/2024 0005 by Mely Meyers RN  Infection Prevention: hand hygiene promoted  Taken 5/24/2024 1934 by Mely Meyers RN  Infection Prevention: hand hygiene promoted   Goal Outcome Evaluation:       Pt vs are WNL. Has had a couple of elevated BP's (not requiring treatment) after ambulation but resolve after. Denies sxs of preeclampsia. Baby is very difficult to keep on monitor. Will be trying Emy again. It was not available earlier. States  that she can feel a few contractions but not strong.

## 2024-05-25 NOTE — ANESTHESIA PREPROCEDURE EVALUATION
Anesthesia Pre-Procedure Evaluation    Patient: Praveena Hernandez   MRN: [id number] : 1990        Procedure : * No procedures listed *          Past Medical History:   Diagnosis Date    Asthma     Mild, intermittent asthma.  Albuterol PRN.  Trigger is viral or other illness.  Some cold trigger too    Female infertility     Attempted pregnancy X2 years.  History of very irregular cycles through whole lifetime (no dx PCOS or other).  Recently ~5 periods/year (will skip up to 5-6 months).  Got pregnant on own in 2023!    Irregular menstrual cycle     History of very irregular cycles through whole lifetime (no dx PCOS or other dx). In recent years (3297-7245) having ~5 periods/year (will skip up to 5-6 months, and other times will happen monthly)    Migraine     First started happening in early 20s.  Irregular in nature (may get a grouping of a few in a week, and then won't have any for months).  Gets light sensitivity.  Uses OTC excedrine/water/rest in dark room    Pre-diabetes     Diagnosed with this in the past      Past Surgical History:   Procedure Laterality Date    wisdom teeth Bilateral     age 19      Allergies   Allergen Reactions    Sulfa Antibiotics Other (See Comments) and Shortness Of Breath      Social History     Tobacco Use    Smoking status: Never     Passive exposure: Never    Smokeless tobacco: Never    Tobacco comments:     no smokers in the home   Substance Use Topics    Alcohol use: No     Comment: Alcoholic Drinks/day: rare      Wt Readings from Last 1 Encounters:   24 117 kg (258 lb)        Anesthesia Evaluation   Pt has not had prior anesthetic         ROS/MED HX  ENT/Pulmonary:  - neg pulmonary ROS   (+)     MELISSA risk factors,  hypertension, obese,              asthma               (-) tobacco use   Neurologic:     (+)      migraines,                          Cardiovascular:  - neg cardiovascular ROS   (+)  hypertension- -   -  - -                                       METS/Exercise Tolerance:     Hematologic:     (+) History of blood clots,     anemia,          Musculoskeletal:  - neg musculoskeletal ROS     GI/Hepatic:  - neg GI/hepatic ROS     Renal/Genitourinary:       Endo:     (+)  type II DM,             Obesity,       Psychiatric/Substance Use:  - neg psychiatric ROS     Infectious Disease:  - neg infectious disease ROS     Malignancy:  - neg malignancy ROS     Other:      (+) Possibly pregnant, , ,         Physical Exam    Airway        Mallampati: II   TM distance: > 3 FB   Neck ROM: full   Mouth opening: > 3 cm    Respiratory Devices and Support         Dental  no notable dental history     (+) Minor Abnormalities - some fillings, tiny chips      Cardiovascular   cardiovascular exam normal          Pulmonary   pulmonary exam normal                OUTSIDE LABS:  CBC:   Lab Results   Component Value Date    WBC 10.8 05/22/2024    WBC 9.3 11/27/2023    HGB 11.7 05/22/2024    HGB 12.3 11/27/2023    HCT 37.4 05/22/2024    HCT 37.7 11/27/2023     05/25/2024     05/22/2024     BMP:   Lab Results   Component Value Date     05/22/2024     05/24/2021     05/24/2021    POTASSIUM 4.2 05/22/2024    POTASSIUM 4.5 05/24/2021    POTASSIUM 5.0 05/24/2021    CHLORIDE 104 05/22/2024    CHLORIDE 107 05/24/2021    CHLORIDE 107 05/24/2021    CO2 21 (L) 05/22/2024    CO2 22 05/24/2021    CO2 21 (L) 05/24/2021    BUN 9.2 05/22/2024    BUN 13 05/24/2021    BUN 13 05/24/2021    CR 0.76 05/22/2024    CR 0.75 05/24/2021    CR 0.74 05/24/2021     (H) 05/22/2024     05/24/2021     05/24/2021     COAGS:   Lab Results   Component Value Date    PTT 33 05/25/2024    INR 0.95 05/25/2024     POC:   Lab Results   Component Value Date    HCGS Negative 05/24/2021     HEPATIC:   Lab Results   Component Value Date    ALBUMIN 3.3 (L) 05/22/2024    PROTTOTAL 6.8 05/22/2024    ALT 11 05/22/2024    AST 22 05/22/2024    ALKPHOS 123 05/22/2024    BILITOTAL <0.2  05/22/2024     OTHER:   Lab Results   Component Value Date    LEONELA 8.6 05/22/2024    PHOS 3.0 05/24/2021    TSH 1.05 06/06/2008    T4 1.04 06/06/2008    CRP 0.7 11/22/2018       Anesthesia Plan    ASA Status:  3, emergent    NPO Status:  NPO Appropriate    Anesthesia Type: Epidural.         Techniques and Equipment:     - Lines/Monitors: 2nd IV     Consents    Anesthesia Plan(s) and associated risks, benefits, and realistic alternatives discussed. Questions answered and patient/representative(s) expressed understanding.     - Discussed:     - Discussed with:  Patient      - Extended Intubation/Ventilatory Support Discussed: No.      - Patient is DNR/DNI Status: No     Use of blood products discussed: Yes.     - Discussed with: Patient.     - Consented: consented to blood products     Postoperative Care    Pain management: IV analgesics, Oral pain medications, intrathecal morphine, Multi-modal analgesia.   PONV prophylaxis: Ondansetron (or other 5HT-3), Dexamethasone or Solumedrol     Comments:    Other Comments: Duramorph.  PE infusion.  Decadron, Zofran.  Toradol, Tylenol.         neg OB ROS.      Amol Frank MD    I have reviewed the pertinent notes and labs in the chart from the past 30 days and (re)examined the patient.  Any updates or changes from those notes are reflected in this note.

## 2024-05-25 NOTE — PROGRESS NOTES
"S: Praveena is reporting the contractions are getting more intense. She is thinking she would like to try some nitrous or fentanyl to take the edge off.    O: /62 (Patient Position: Semi-Maher's, Cuff Size: Adult Large)   Pulse 108   Temp 98.2  F (36.8  C) (Oral)   Resp 20   Ht 1.6 m (5' 3\")   Wt 117 kg (258 lb)   LMP 2023   SpO2 98%   BMI 45.70 kg/m    EFM: Tracing is spotting; indeterminate  [place]: q2-3 minutes  SVE: /-2    A: 33 year old  at 38w1d undergoing IOL for preeclampsia w/o severe features; latent labor    P:   Reviewed R/B/A of IUPC and FSE to help with monitoring and to allow for more mobility. She consented and both monitors were placed with ease.  May try nitrous, but would like to wait to achieve at least 10 minute of consistent monitoring and she is in agreement.  May have labor epidural upon request.   Will add Pitocin if contractions begin to space out.  Continue towards .    Dr. Cullen updated. She remains available for consultation and collaboration as needed.    JAS Mullen CNM   "

## 2024-05-25 NOTE — PROGRESS NOTES
Patient requesting epidural   Dr. Frank paged and orders for PTT, INR and platelets.   In room at 1325. Patient sitting up for procedure. TOlerated well.   Left tilt positioning at 1342.   Serial BPs and continuous pulse ox in place.     Jeanie Marks RN

## 2024-05-25 NOTE — PLAN OF CARE
Problem: Labor  Goal: Stable Fetal Wellbeing  5/24/2024 1905 by Jeanie Marks RN  Outcome: Progressing    Goal: Effective Progression to Delivery  5/24/2024 1905 by Jeanie Marks RN  Outcome: Progressing    Goal: Acceptable Pain Control  5/24/2024 1905 by Jeanie Marks RN  Outcome: Progressing    Goal: Normal Uterine Contraction Pattern  Outcome: Progressing   Goal Outcome Evaluation:         Patient continues with cervical ripening. FHTs with moderate variability and accelerations. Patient complains of back pain and cramping that is controlled with kpad, tylenol, flexaril.

## 2024-05-25 NOTE — PROGRESS NOTES
1624: FHTs decreased to 80s in prolonged deceleration after repositioning to right lateral. Patient was quickly repositioned to her left side. IV pitocin stopped and fluid bolus started. Dr. Dave at bedside to assess.   Return to baseline after 3 min at 1628.   Will continue to monitor and keep pitocin off until reactive strip.

## 2024-05-25 NOTE — ANESTHESIA PROCEDURE NOTES
"Epidural catheter Procedure Note    Pre-Procedure   Staff -        Anesthesiologist:  Amol Frank MD       Performed By: anesthesiologist       Location: OB       Procedure Start/Stop Times: 5/25/2024 1:28 PM and 5/25/2024 1:44 PM       Pre-Anesthestic Checklist: patient identified, IV checked, risks and benefits discussed, informed consent, monitors and equipment checked, pre-op evaluation and post-op pain management  Timeout:       Correct Patient: Yes        Correct Procedure: Yes        Correct Site: Yes        Correct Position: Yes   Procedure Documentation  Procedure: epidural catheter       Diagnosis: labor pain       Patient Position: sitting       Patient Prep/Sterile Barriers: sterile gloves, mask, patient draped       Skin prep: Chloraprep       Local skin infiltrated with 3 mL of 1% lidocaine.        Insertion Site: L3-4. (midline approach).       Technique: LORT air        Needle Type: TPI Composites       Needle Gauge: 18.        Needle Length (Inches): 3.5        Catheter: 20 G.          Catheter threaded easily.         6 cm epidural space.           # of attempts: 1 and  # of redirects:  0    Assessment/Narrative         Paresthesias: No.       Test dose of 3 mL lidocaine 1.5% w/ 1:200,000 epinephrine at 13:38 CDT.         Test dose negative, 3 minutes after injection, for signs of intravascular, subdural, or intrathecal injection.       Insertion/Infusion Method: LORT air       Aspiration negative for Heme or CSF via Epidural Catheter.    Medication(s) Administered   0.25% Bupivacaine PF (Epidural) - EPIDURAL   5 mL - 5/25/2024 1:44:00 PM  Medication Administration Time: 5/25/2024 1:28 PM     Comments:  Single pass. No paresthesia. Catheter with ease. Test dose Neg SA/IV sx      FOR [facility] ([facility] ONLY:   Pain Team Contact information: please page the Pain Team Via [employer]. Search \"Pain\". During daytime hours, please page the attending first. At night please page the resident first.      "

## 2024-05-25 NOTE — PROGRESS NOTES
"S: Praveena was able to rest well overnight. Is noting more uterine cramping this morning.    O: /62 (Patient Position: Semi-Maher's, Cuff Size: Adult Large)   Pulse 108   Temp 98.2  F (36.8  C) (Oral)   Resp 20   Ht 1.6 m (5' 3\")   Wt 117 kg (258 lb)   LMP 2023   SpO2 98%   BMI 45.70 kg/m    Emy EFM: 140 baseline, moderate variability, +accelerations, rare variable deceleration  Emy [place]: q3-8 minutes, lasting 40-60 seconds  SVE: /50/-2    A: 33 year old G1P at 38w1d undergoing IOL for preeclampsia without severe features; s/p 12 doses oral Cytotec, 12hrs Cervidil, 4 doses of vaginal Cytotec    P:  -Discussed cervix is now favorable, recommended proceeding with Pitocin IOL.  -Upon sitting up from exam, patient reported SROM. Fluid clear.  -Continue towards .    Dr. Cullen remains available for consultation and collaboration as needed.    JAS MullenM   "

## 2024-05-26 ENCOUNTER — APPOINTMENT (OUTPATIENT)
Dept: RADIOLOGY | Facility: HOSPITAL | Age: 34
End: 2024-05-26
Attending: OBSTETRICS & GYNECOLOGY
Payer: COMMERCIAL

## 2024-05-26 LAB — HGB BLD-MCNC: 10.4 G/DL (ref 11.7–15.7)

## 2024-05-26 PROCEDURE — 250N000013 HC RX MED GY IP 250 OP 250 PS 637: Performed by: ADVANCED PRACTICE MIDWIFE

## 2024-05-26 PROCEDURE — 85018 HEMOGLOBIN: CPT | Performed by: OBSTETRICS & GYNECOLOGY

## 2024-05-26 PROCEDURE — 250N000013 HC RX MED GY IP 250 OP 250 PS 637: Performed by: OBSTETRICS & GYNECOLOGY

## 2024-05-26 PROCEDURE — 250N000011 HC RX IP 250 OP 636: Performed by: OBSTETRICS & GYNECOLOGY

## 2024-05-26 PROCEDURE — 36415 COLL VENOUS BLD VENIPUNCTURE: CPT | Performed by: OBSTETRICS & GYNECOLOGY

## 2024-05-26 PROCEDURE — 120N000001 HC R&B MED SURG/OB

## 2024-05-26 PROCEDURE — 71045 X-RAY EXAM CHEST 1 VIEW: CPT

## 2024-05-26 PROCEDURE — 250N000011 HC RX IP 250 OP 636: Performed by: REGISTERED NURSE

## 2024-05-26 RX ORDER — ONDANSETRON 4 MG/1
4 TABLET, ORALLY DISINTEGRATING ORAL EVERY 30 MIN PRN
Status: DISCONTINUED | OUTPATIENT
Start: 2024-05-26 | End: 2024-05-27 | Stop reason: HOSPADM

## 2024-05-26 RX ORDER — ALBUTEROL SULFATE 90 UG/1
1-2 AEROSOL, METERED RESPIRATORY (INHALATION)
Status: DISCONTINUED | OUTPATIENT
Start: 2024-05-26 | End: 2024-05-27 | Stop reason: HOSPADM

## 2024-05-26 RX ORDER — OXYTOCIN 10 [USP'U]/ML
10 INJECTION, SOLUTION INTRAMUSCULAR; INTRAVENOUS
Status: DISCONTINUED | OUTPATIENT
Start: 2024-05-26 | End: 2024-05-27 | Stop reason: HOSPADM

## 2024-05-26 RX ORDER — OXYTOCIN/0.9 % SODIUM CHLORIDE 30/500 ML
340 PLASTIC BAG, INJECTION (ML) INTRAVENOUS CONTINUOUS PRN
Status: DISCONTINUED | OUTPATIENT
Start: 2024-05-26 | End: 2024-05-27 | Stop reason: HOSPADM

## 2024-05-26 RX ORDER — FENTANYL CITRATE 50 UG/ML
25 INJECTION, SOLUTION INTRAMUSCULAR; INTRAVENOUS EVERY 5 MIN PRN
Status: DISCONTINUED | OUTPATIENT
Start: 2024-05-26 | End: 2024-05-27 | Stop reason: HOSPADM

## 2024-05-26 RX ORDER — METOCLOPRAMIDE HYDROCHLORIDE 5 MG/ML
10 INJECTION INTRAMUSCULAR; INTRAVENOUS EVERY 6 HOURS PRN
Status: DISCONTINUED | OUTPATIENT
Start: 2024-05-26 | End: 2024-05-27 | Stop reason: HOSPADM

## 2024-05-26 RX ORDER — DEXAMETHASONE SODIUM PHOSPHATE 4 MG/ML
4 INJECTION, SOLUTION INTRA-ARTICULAR; INTRALESIONAL; INTRAMUSCULAR; INTRAVENOUS; SOFT TISSUE
Status: DISCONTINUED | OUTPATIENT
Start: 2024-05-26 | End: 2024-05-27 | Stop reason: HOSPADM

## 2024-05-26 RX ORDER — KETOROLAC TROMETHAMINE 30 MG/ML
30 INJECTION, SOLUTION INTRAMUSCULAR; INTRAVENOUS EVERY 6 HOURS
Status: COMPLETED | OUTPATIENT
Start: 2024-05-26 | End: 2024-05-26

## 2024-05-26 RX ORDER — LOPERAMIDE HCL 2 MG
4 CAPSULE ORAL
Status: DISCONTINUED | OUTPATIENT
Start: 2024-05-26 | End: 2024-05-27 | Stop reason: HOSPADM

## 2024-05-26 RX ORDER — LIDOCAINE 40 MG/G
CREAM TOPICAL
Status: DISCONTINUED | OUTPATIENT
Start: 2024-05-26 | End: 2024-05-27 | Stop reason: HOSPADM

## 2024-05-26 RX ORDER — BISACODYL 10 MG
10 SUPPOSITORY, RECTAL RECTAL DAILY PRN
Status: DISCONTINUED | OUTPATIENT
Start: 2024-05-28 | End: 2024-05-27 | Stop reason: HOSPADM

## 2024-05-26 RX ORDER — ENOXAPARIN SODIUM 100 MG/ML
40 INJECTION SUBCUTANEOUS EVERY 12 HOURS
Status: DISCONTINUED | OUTPATIENT
Start: 2024-05-26 | End: 2024-05-27 | Stop reason: HOSPADM

## 2024-05-26 RX ORDER — ACETAMINOPHEN 325 MG/1
975 TABLET ORAL EVERY 6 HOURS
Status: DISCONTINUED | OUTPATIENT
Start: 2024-05-26 | End: 2024-05-27 | Stop reason: HOSPADM

## 2024-05-26 RX ORDER — MISOPROSTOL 200 UG/1
800 TABLET ORAL
Status: DISCONTINUED | OUTPATIENT
Start: 2024-05-26 | End: 2024-05-27 | Stop reason: HOSPADM

## 2024-05-26 RX ORDER — HYDROCORTISONE 25 MG/G
CREAM TOPICAL 3 TIMES DAILY PRN
Status: DISCONTINUED | OUTPATIENT
Start: 2024-05-26 | End: 2024-05-27 | Stop reason: HOSPADM

## 2024-05-26 RX ORDER — SIMETHICONE 80 MG
80 TABLET,CHEWABLE ORAL 4 TIMES DAILY PRN
Status: DISCONTINUED | OUTPATIENT
Start: 2024-05-26 | End: 2024-05-27 | Stop reason: HOSPADM

## 2024-05-26 RX ORDER — MODIFIED LANOLIN
OINTMENT (GRAM) TOPICAL
Status: DISCONTINUED | OUTPATIENT
Start: 2024-05-26 | End: 2024-05-27 | Stop reason: HOSPADM

## 2024-05-26 RX ORDER — CARBOPROST TROMETHAMINE 250 UG/ML
250 INJECTION, SOLUTION INTRAMUSCULAR
Status: DISCONTINUED | OUTPATIENT
Start: 2024-05-26 | End: 2024-05-27 | Stop reason: HOSPADM

## 2024-05-26 RX ORDER — ONDANSETRON 2 MG/ML
4 INJECTION INTRAMUSCULAR; INTRAVENOUS EVERY 6 HOURS PRN
Status: DISCONTINUED | OUTPATIENT
Start: 2024-05-26 | End: 2024-05-27 | Stop reason: HOSPADM

## 2024-05-26 RX ORDER — NALOXONE HYDROCHLORIDE 0.4 MG/ML
0.1 INJECTION, SOLUTION INTRAMUSCULAR; INTRAVENOUS; SUBCUTANEOUS
Status: DISCONTINUED | OUTPATIENT
Start: 2024-05-26 | End: 2024-05-27 | Stop reason: HOSPADM

## 2024-05-26 RX ORDER — ONDANSETRON 2 MG/ML
4 INJECTION INTRAMUSCULAR; INTRAVENOUS EVERY 30 MIN PRN
Status: DISCONTINUED | OUTPATIENT
Start: 2024-05-26 | End: 2024-05-27 | Stop reason: HOSPADM

## 2024-05-26 RX ORDER — AMOXICILLIN 250 MG
2 CAPSULE ORAL 2 TIMES DAILY
Status: DISCONTINUED | OUTPATIENT
Start: 2024-05-26 | End: 2024-05-27 | Stop reason: HOSPADM

## 2024-05-26 RX ORDER — ONDANSETRON 4 MG/1
4 TABLET, ORALLY DISINTEGRATING ORAL EVERY 6 HOURS PRN
Status: DISCONTINUED | OUTPATIENT
Start: 2024-05-26 | End: 2024-05-27 | Stop reason: HOSPADM

## 2024-05-26 RX ORDER — IBUPROFEN 800 MG/1
800 TABLET, FILM COATED ORAL EVERY 6 HOURS
Status: DISCONTINUED | OUTPATIENT
Start: 2024-05-27 | End: 2024-05-27 | Stop reason: HOSPADM

## 2024-05-26 RX ORDER — OXYCODONE HYDROCHLORIDE 5 MG/1
5 TABLET ORAL EVERY 4 HOURS PRN
Status: DISCONTINUED | OUTPATIENT
Start: 2024-05-26 | End: 2024-05-27 | Stop reason: HOSPADM

## 2024-05-26 RX ORDER — FERROUS SULFATE 325(65) MG
325 TABLET ORAL DAILY
Status: DISCONTINUED | OUTPATIENT
Start: 2024-05-26 | End: 2024-05-27 | Stop reason: HOSPADM

## 2024-05-26 RX ORDER — MEPERIDINE HYDROCHLORIDE 25 MG/ML
12.5 INJECTION INTRAMUSCULAR; INTRAVENOUS; SUBCUTANEOUS EVERY 5 MIN PRN
Status: DISCONTINUED | OUTPATIENT
Start: 2024-05-26 | End: 2024-05-27 | Stop reason: HOSPADM

## 2024-05-26 RX ORDER — PROCHLORPERAZINE MALEATE 10 MG
10 TABLET ORAL EVERY 6 HOURS PRN
Status: DISCONTINUED | OUTPATIENT
Start: 2024-05-26 | End: 2024-05-27 | Stop reason: HOSPADM

## 2024-05-26 RX ORDER — KETOROLAC TROMETHAMINE 30 MG/ML
15 INJECTION, SOLUTION INTRAMUSCULAR; INTRAVENOUS
Status: DISCONTINUED | OUTPATIENT
Start: 2024-05-26 | End: 2024-05-27 | Stop reason: HOSPADM

## 2024-05-26 RX ORDER — AMOXICILLIN 250 MG
1 CAPSULE ORAL 2 TIMES DAILY
Status: DISCONTINUED | OUTPATIENT
Start: 2024-05-26 | End: 2024-05-27 | Stop reason: HOSPADM

## 2024-05-26 RX ORDER — METHYLERGONOVINE MALEATE 0.2 MG/ML
200 INJECTION INTRAVENOUS
Status: DISCONTINUED | OUTPATIENT
Start: 2024-05-26 | End: 2024-05-27 | Stop reason: HOSPADM

## 2024-05-26 RX ORDER — TRANEXAMIC ACID 10 MG/ML
1 INJECTION, SOLUTION INTRAVENOUS EVERY 30 MIN PRN
Status: DISCONTINUED | OUTPATIENT
Start: 2024-05-26 | End: 2024-05-27 | Stop reason: HOSPADM

## 2024-05-26 RX ORDER — DEXTROSE, SODIUM CHLORIDE, SODIUM LACTATE, POTASSIUM CHLORIDE, AND CALCIUM CHLORIDE 5; .6; .31; .03; .02 G/100ML; G/100ML; G/100ML; G/100ML; G/100ML
INJECTION, SOLUTION INTRAVENOUS CONTINUOUS
Status: DISCONTINUED | OUTPATIENT
Start: 2024-05-26 | End: 2024-05-27

## 2024-05-26 RX ORDER — LOPERAMIDE HCL 2 MG
2 CAPSULE ORAL
Status: DISCONTINUED | OUTPATIENT
Start: 2024-05-26 | End: 2024-05-27 | Stop reason: HOSPADM

## 2024-05-26 RX ORDER — SODIUM CHLORIDE, SODIUM LACTATE, POTASSIUM CHLORIDE, CALCIUM CHLORIDE 600; 310; 30; 20 MG/100ML; MG/100ML; MG/100ML; MG/100ML
INJECTION, SOLUTION INTRAVENOUS CONTINUOUS
Status: DISCONTINUED | OUTPATIENT
Start: 2024-05-26 | End: 2024-05-27

## 2024-05-26 RX ORDER — FENTANYL CITRATE 50 UG/ML
50 INJECTION, SOLUTION INTRAMUSCULAR; INTRAVENOUS EVERY 5 MIN PRN
Status: DISCONTINUED | OUTPATIENT
Start: 2024-05-26 | End: 2024-05-27 | Stop reason: HOSPADM

## 2024-05-26 RX ORDER — PROCHLORPERAZINE 25 MG
25 SUPPOSITORY, RECTAL RECTAL EVERY 12 HOURS PRN
Status: DISCONTINUED | OUTPATIENT
Start: 2024-05-26 | End: 2024-05-27 | Stop reason: HOSPADM

## 2024-05-26 RX ORDER — MISOPROSTOL 200 UG/1
400 TABLET ORAL
Status: DISCONTINUED | OUTPATIENT
Start: 2024-05-26 | End: 2024-05-27 | Stop reason: HOSPADM

## 2024-05-26 RX ORDER — METOCLOPRAMIDE 10 MG/1
10 TABLET ORAL EVERY 6 HOURS PRN
Status: DISCONTINUED | OUTPATIENT
Start: 2024-05-26 | End: 2024-05-27 | Stop reason: HOSPADM

## 2024-05-26 RX ADMIN — DOCUSATE SODIUM 50 MG AND SENNOSIDES 8.6 MG 1 TABLET: 8.6; 5 TABLET, FILM COATED ORAL at 10:00

## 2024-05-26 RX ADMIN — Medication: at 03:10

## 2024-05-26 RX ADMIN — ACETAMINOPHEN 975 MG: 325 TABLET ORAL at 16:51

## 2024-05-26 RX ADMIN — FAMOTIDINE 10 MG: 10 TABLET ORAL at 22:33

## 2024-05-26 RX ADMIN — DOCUSATE SODIUM 50 MG AND SENNOSIDES 8.6 MG 1 TABLET: 8.6; 5 TABLET, FILM COATED ORAL at 22:34

## 2024-05-26 RX ADMIN — ACETAMINOPHEN 975 MG: 325 TABLET ORAL at 09:59

## 2024-05-26 RX ADMIN — ENOXAPARIN SODIUM 40 MG: 40 INJECTION SUBCUTANEOUS at 22:34

## 2024-05-26 RX ADMIN — KETOROLAC TROMETHAMINE 30 MG: 30 INJECTION, SOLUTION INTRAMUSCULAR at 18:52

## 2024-05-26 RX ADMIN — KETOROLAC TROMETHAMINE 30 MG: 30 INJECTION, SOLUTION INTRAMUSCULAR at 06:21

## 2024-05-26 RX ADMIN — FERROUS SULFATE TAB 325 MG (65 MG ELEMENTAL FE) 325 MG: 325 (65 FE) TAB at 10:00

## 2024-05-26 RX ADMIN — ACETAMINOPHEN 975 MG: 325 TABLET ORAL at 22:33

## 2024-05-26 RX ADMIN — ACETAMINOPHEN 975 MG: 325 TABLET ORAL at 05:12

## 2024-05-26 RX ADMIN — KETOROLAC TROMETHAMINE 30 MG: 30 INJECTION, SOLUTION INTRAMUSCULAR at 11:47

## 2024-05-26 RX ADMIN — KETOROLAC TROMETHAMINE 30 MG: 30 INJECTION, SOLUTION INTRAMUSCULAR at 00:06

## 2024-05-26 RX ADMIN — METOCLOPRAMIDE 10 MG: 5 INJECTION, SOLUTION INTRAMUSCULAR; INTRAVENOUS at 16:51

## 2024-05-26 ASSESSMENT — ACTIVITIES OF DAILY LIVING (ADL)
ADLS_ACUITY_SCORE: 18
ADLS_ACUITY_SCORE: 19
ADLS_ACUITY_SCORE: 19
ADLS_ACUITY_SCORE: 18
ADLS_ACUITY_SCORE: 18
ADLS_ACUITY_SCORE: 19
ADLS_ACUITY_SCORE: 18
ADLS_ACUITY_SCORE: 19
ADLS_ACUITY_SCORE: 18
ADLS_ACUITY_SCORE: 19
ADLS_ACUITY_SCORE: 18
ADLS_ACUITY_SCORE: 19
ADLS_ACUITY_SCORE: 19
ADLS_ACUITY_SCORE: 18
ADLS_ACUITY_SCORE: 19
ADLS_ACUITY_SCORE: 19

## 2024-05-26 NOTE — ANESTHESIA POSTPROCEDURE EVALUATION
Patient: Praveena Hernandez    Procedure: Procedure(s):   SECTION       Anesthesia Type:  Epidural    Note:  Disposition: Inpatient   Postop Pain Control: Uneventful            Sign Out: Well controlled pain   PONV: No   Neuro/Psych: Uneventful            Sign Out: Acceptable/Baseline neuro status   Airway/Respiratory: Uneventful            Sign Out: Acceptable/Baseline resp. status   CV/Hemodynamics: Uneventful            Sign Out: Acceptable CV status; No obvious hypovolemia; No obvious fluid overload   Other NRE: NONE   DID A NON-ROUTINE EVENT OCCUR? No     Epidural-to- Updated ASA: 3 Emergent      Last vitals:  Vitals:    24 1130 24 1540 24 1700   BP: 125/70 (!) 141/89 119/81   Pulse: 79 99 104   Resp: 18 18 20   Temp: 36.7  C (98  F) 36.6  C (97.9  F) 36.7  C (98  F)   SpO2: 97% 99% 99%       Electronically Signed By: Brendan Aly MD  May 26, 2024  5:30 PM

## 2024-05-26 NOTE — PROGRESS NOTES
"S: Praveena continues to rest in bed.    O: /55 (BP Location: Right arm, Patient Position: Left side, Cuff Size: Adult Large)   Pulse 108   Temp 98.3  F (36.8  C) (Oral)   Resp 18   Ht 1.6 m (5' 3\")   Wt 117 kg (258 lb)   LMP 2023   SpO2 98%   BMI 45.70 kg/m    EFM: 150 baseline, persistent minimal variability  Panacea: q1.5-4 minutes, lasting 60-80 seconds  SVE: 4-5/80/-3 to -2; some edema noted    A: 33 year old  at 38w1d arrest of dilation and persistent Category II fetal heart rate tracing    P:   Reviewed with Praveena and her family given fetal heart tones still have minimal variability despite fluid flush and repositioning, given cervix is still unchanged, save some edema noted, I think it is time to proceed with a  section.  We reviewed R/B of  section. We discussed risks of hemorrhage, infection, and blood clots. She consents to receiving blood products if recommended.  Dr. Cullen consulted and in agreement with plan of care. She is en route to the unit and will assume care of the patient upon arrival. CNM will stay on in supportive role  Proceed with  section.    JAS MullenM   "

## 2024-05-26 NOTE — PLAN OF CARE
Problem: Postpartum ( Delivery)  Goal: Successful Parent Role Transition  Outcome: Progressing  Goal: Anesthesia/Sedation Recovery  Outcome: Progressing  Goal: Optimal Pain Control and Function  Outcome: Progressing     Problem: Breastfeeding  Goal: Effective Breastfeeding  Outcome: Progressing     Pt VSS. Pt able to roll ankles, bend knees and lift hips off bed. Pt plans to ambulate at 0500. Pt breast fed  x1, breast shield used. Pt denies pain. Pt transfer to postpartum care.

## 2024-05-26 NOTE — PROGRESS NOTES
"S: Praveena continues to rest comfortably in bed.    O: /55 (BP Location: Right arm, Patient Position: Left side, Cuff Size: Adult Large)   Pulse 108   Temp 98.3  F (36.8  C) (Oral)   Resp 18   Ht 1.6 m (5' 3\")   Wt 117 kg (258 lb)   LMP 2023   SpO2 98%   BMI 45.70 kg/m     FSE: 150 baseline, minimal variablility  IUPC: q2-3 minutes   SVE: /-3 to -2, unchanged since exam at 1630 & 1400  MVUs have been adequate at 180-240 since 1730; had been about 150 prior to that    A: 33 year old  at 38w1d protracted labor    P:   Discussed option for  given no change in cervix over the past 6.5 hours. We discussed that if baby begins to have moderate variability, again can consider laboring for another 2 hours and reevaluate.  Questions answered and family given time to discuss alone.  After time given and questions answered, Praveena elects to wait until  and be reexamined to see if she has made change.  Hopeful .    Dr. Cullen remains available for consutlation and collaboration as needed.  "

## 2024-05-26 NOTE — OP NOTE
I assisted Dr. Cullen in primary  section secondary to arrest of dilation and nonreassuring fetal heart tones. I assisted with retraction and visualization. I was present from incision to closure. Please see Dr. Cullen's note for full details.     JAS MullenM

## 2024-05-26 NOTE — PROGRESS NOTES
Ob Attending Note    Called to hospital for arrest of dilation and decreasing fetal variability with new onset fetal tachycardia. Has been 5cm for >8 hours on pitocin and ruptured. Discussed recommendation for primary  section. The risks, benefits, & alternatives of  were discussed with the patient including risks of pain, blood loss requiring blood transfusion (consents to transfusion if needed), infection, damage to surrounding organs including bowel, bladder, ureter, fetus, increased risk of DVT/PE, and increased hospitalization and recovery time.  Possible other unforseen risks. Patient voiced understanding & agreed to proceed with .    Amy Cullen MD  Ob/Gyn  [facility]

## 2024-05-26 NOTE — PROVIDER NOTIFICATION
05/26/24 0320   Provider Notification   Provider Name/Title Dr. Cullen   Method of Notification Phone   Request Evaluate-Remote   Notification Reason Other     Spoke with Dr. Cullen regarding patient's hypertensive blood pressures this AM. Per provider, notify if severe range (>160 or/and >110) and provider will assess in the AM if oral medications are necessary. Also asked about the creatinine lab that was timed for 0230 this AM, discontinued the order per provider.

## 2024-05-26 NOTE — LACTATION NOTE
This note was copied from a baby's chart.  Lactation Visit:      Hours since Delivery: 11 hours old.    Gestational Age at Delivery: 38.1 weeks.    Visit with Lactation: Mother is a primip with a history of infertility, elevated BMI and GHTN. Since birth, infant has been to breast 4 times, has voided x3, has not stooled yet, and will be weighed at 24 hour  screening. Mother states infant had a few good feedings, and was sleepy at breast last feeding, and she is feeling like infant is preferring one breast over the other. Discussed  feeding behaviors during first few days of life, waking techniques, skin-to-skin, hunger cues, hand expression, the importance of tracking infants feedings and diaper output, as well as supplementation volumes and pumping if infant isn't going to breast for feedings. Education given on importance of infant positioning for a deep, comfortable latch and effective milk transfer. Instructed on techniques for keeping infant actively sucking, including breast compressions. Anticipatory guidance given on input/output goals, normal feeding volumes in the  period, and pumping. Family agreeable with LC assisting with next feeding. Consulted with bedside RN. Encouraged mother to let primary RN know if she would like lactation to return for feeding assistance or if questions arise. Mother aware of lactation resources available to her after discharging from hospital.     Update: Assist with feeding - Called to room to assist with feeding; upon entering room, mother holding sleeping swaddled infant in bedside chair. Reviewed waking techniques while mother positioned herself comfortably in bed for football hold. Mother hand expressed, and infant briefly attempted to latch, then became gaggy and spitty, and started to swallow what he had tried to spit up; infant then got hiccups and fell asleep. Encouraged mother to do skin-to-skin with infant, and to hand express and offer to infant  "if he show hunger cues, or to call LC back to room. Mother verbalized understanding, and states she would like to wait on hand expressing, and \"give him more time\". Consulted with bedside RN, will round on family tomorrow.      Plan: Skin-to-skin prior to feedings. Continue breastfeeding on-demand and/or every 2-3 hours. Track feedings and diaper output.    Has Breast Pump for Home: Yes, Spectra.    Education given: Stages of milk production after delivery,  behaviors during first few days of life, Hunger cues, Breastfeeding positions, How to obtain & maintain a deep, comfortable latch, Listening & watching for swallows, How do I know if my baby is finished & getting enough, Benefits of skin-to-skin prior to feedings, Importance of tracking all feedings and diaper output, Nutritive vs non-nutritive sucking, Pumping for missed feedings at breast, Burping, Cluster feeding, How to tell if breastfeeding is going well, Supplementation volumes during first few days of life,  waking techniques, Pacifier use per AAP, Banked donor breast milk, How to adjust a shallow latch, Techniques for keeping infant actively sucking, including breast compressions, and Breast pump use for home.    "

## 2024-05-26 NOTE — PLAN OF CARE
Goal Outcome Evaluation:  Assumed care from labor RN at 0210 and performed bedside handoff and dual fundal check. VSS except for blood pressures, see provider notification. No severe range blood pressures on my shift. Reflexes +1/+2. Denies preeclampsia symptoms. Patient refuses continuous pulse oximetry. RN education on rationale and risks of not being monitored continuously, patient verbalized understanding. Removed jefferson catheter at 0600 this AM, patient educated on needed to void by 1000 this AM and she verbalized understanding. Ambulates stand by assist, steady gait. Significant other is present and supportive at bedside.   Problem: Adult Inpatient Plan of Care  Goal: Plan of Care Review  Description: The Plan of Care Review/Shift note should be completed every shift.  The Outcome Evaluation is a brief statement about your assessment that the patient is improving, declining, or no change.  This information will be displayed automatically on your shift  note.  Outcome: Progressing  Flowsheets (Taken 2024 0751)  Plan of Care Reviewed With: patient  Overall Patient Progress: improving     Problem: Postpartum ( Delivery)  Goal: Successful Parent Role Transition  Outcome: Progressing     Problem: Postpartum ( Delivery)  Goal: Optimal Pain Control and Function  Outcome: Progressing     Problem: Postpartum ( Delivery)  Goal: Effective Urinary Elimination  Outcome: Progressing     Problem: Breastfeeding  Goal: Effective Breastfeeding  Outcome: Progressing       Plan of Care Reviewed With: patient    Overall Patient Progress: improvingOverall Patient Progress: improving

## 2024-05-26 NOTE — ANESTHESIA CARE TRANSFER NOTE
Patient: Praveena Hernandez    Procedure: Procedure(s):   SECTION       Diagnosis: Failure to progress in labor [O62.2]  Diagnosis Additional Information: No value filed.    Anesthesia Type:   Epidural     Note:    Oropharynx: oropharynx clear of all foreign objects and spontaneously breathing  Level of Consciousness: awake  Oxygen Supplementation: room air    Independent Airway: airway patency satisfactory and stable  Dentition: dentition unchanged  Vital Signs Stable: post-procedure vital signs reviewed and stable  Report to RN Given: handoff report given  Patient transferred to: Labor and Delivery    Handoff Report: Identifed the Patient, Identified the Reponsible Provider, Reviewed the pertinent medical history, Discussed the surgical course, Reviewed Intra-OP anesthesia mangement and issues during anesthesia, Set expectations for post-procedure period and Allowed opportunity for questions and acknowledgement of understanding      Vitals:  Vitals Value Taken Time   /56 24 2350   Temp 98.4    Pulse 102    Resp 18    SpO2 98 % 24 2351   Vitals shown include unfiled device data.    Electronically Signed By: JAS Rutledge CRNA  May 25, 2024  11:52 PM

## 2024-05-26 NOTE — PROGRESS NOTES
" Postpartum Day 1    Patient Name:  Praveena Hernandez  :  1990  MRN:  [id number]      Assessment:    Normal postpartum course.  S/P   gHTN with preeclampsia diagnosed on admission    Plan:    Continue current care.  Start PO Fe  Will get preeclampsia labs if Bps continue to be elevated.    Subjective:  The patient feels well:  Voiding without difficulty, lochia normal, tolerating normal diet, ambulating without difficulty and passing flatus.  Voiding independently without complication. Pain is well controlled with current medications. The patient has no emotional concerns. The baby is well and being fed by breast. Patient denies headache and visual disturbances. States her swelling is less than it was prior to giving birth. Abd dressing removed. Bps have been trending up. Will observe and draw labs again if continue to be high or if symptoms develop.    YOB: 2022   Birth Time: 10:57 PM     Prenatal complications:  1) Pre pregnancy BMI >40  2) Borderline pre diabetes, A1c 5.4. Failed early 1hr GCT, passed 3hr GTT x 2  3) Migraines without aura  4) Asthma  5) Suspected macrosomia  6) gHTN with pre eclampsia diagnosed on admission    Objective:  BP (!) 152/81 (BP Location: Right arm, Patient Position: Sitting, Cuff Size: Adult Regular)   Pulse 78   Temp 98.4  F (36.9  C) (Oral)   Resp 20   Ht 1.6 m (5' 3\")   Wt 117 kg (258 lb)   LMP 2023   SpO2 96%   Breastfeeding Unknown   BMI 45.70 kg/m    Patient Vitals for the past 24 hrs:   BP Temp Temp src Pulse Resp SpO2   24 0625 (!) 152/81 -- -- -- 20 96 %   24 0506 138/80 98.4  F (36.9  C) Oral 78 18 97 %   24 0347 (!) 147/95 -- -- 80 18 98 %   24 0244 131/84 99.3  F (37.4  C) Oral 80 20 98 %   24 0210 (!) 141/81 -- Oral 80 18 96 %   24 0137 (!) 144/77 -- -- -- -- --   24 0122 (!) 142/78 -- -- -- -- --   24 (!) 141/78 -- -- -- -- --   24 -- -- -- -- -- 96 % "   05/26/24 0052 129/69 -- -- -- 16 --   05/26/24 0040 123/69 -- -- -- 18 --   05/26/24 0020 122/68 -- -- -- 18 98 %   05/26/24 0010 -- -- -- -- -- 98 %   05/26/24 0005 110/66 -- -- -- 18 --   05/25/24 2350 105/56 98.4  F (36.9  C) Oral -- 18 92 %   05/25/24 2230 115/67 98.5  F (36.9  C) Oral -- 18 --   05/25/24 2225 128/72 -- -- -- -- --   05/25/24 2120 113/55 98.3  F (36.8  C) Oral -- 18 --   05/25/24 2110 -- -- -- -- -- 98 %   05/25/24 2055 -- -- -- -- -- 98 %   05/25/24 2050 -- -- -- -- -- 99 %   05/25/24 2045 -- -- -- -- -- 99 %   05/25/24 2040 -- -- -- -- -- 97 %   05/25/24 2035 -- -- -- -- -- 98 %   05/25/24 2030 -- -- -- -- -- 98 %   05/25/24 2025 -- -- -- -- -- 98 %   05/25/24 2020 -- -- -- -- -- 98 %   05/25/24 1955 112/69 98.2  F (36.8  C) Oral -- 18 --   05/25/24 1950 -- -- -- -- -- 99 %   05/25/24 1945 109/58 -- -- -- -- 100 %   05/25/24 1940 -- -- -- -- -- 98 %   05/25/24 1935 -- -- -- -- -- 99 %   05/25/24 1930 115/56 -- -- -- -- 97 %   05/25/24 1925 115/56 -- -- -- -- 98 %   05/25/24 1920 -- -- -- -- -- 97 %   05/25/24 1915 -- -- -- -- -- 98 %   05/25/24 1910 111/59 -- -- -- -- 98 %   05/25/24 1905 -- -- -- -- -- 98 %   05/25/24 1900 116/56 98  F (36.7  C) -- -- -- 91 %   05/25/24 1800 123/59 97.9  F (36.6  C) -- -- -- --   05/25/24 1700 116/57 98  F (36.7  C) -- -- -- --   05/25/24 1629 114/60 -- -- -- -- --   05/25/24 1543 -- -- -- -- -- 99 %   05/25/24 1542 110/58 -- -- -- -- --   05/25/24 1527 108/57 -- -- -- -- --   05/25/24 1354 125/69 -- -- -- -- --   05/25/24 1352 130/72 -- -- -- -- 98 %   05/25/24 1350 131/73 -- -- -- -- --   05/25/24 1348 127/66 -- -- -- -- --   05/25/24 1346 132/77 -- -- -- -- --   05/25/24 1344 133/79 -- -- -- -- --   05/25/24 1343 131/72 -- -- -- -- --   05/25/24 1340 (!) 141/70 -- -- -- -- --   05/25/24 1339 (!) 141/71 -- -- -- -- --   05/25/24 1200 (!) 153/68 -- -- -- -- --       Exam: Patient A&O x 3. No acute distress, breathing unlabored. The amount and color of the  lochia is appropriate for the duration of recovery. Uterine fundus is firm at U. Urinary output is adequate. The low transverse sutured incision is clean, dry and intact. Steri strips intact. Bilateral +3 edema in feet.    Lab Results   Component Value Date    AS Negative 05/22/2024    HEPBANG Nonreactive 11/27/2023    HGB 10.4 (L) 05/26/2024       Immunization History   Administered Date(s) Administered    COVID-19 MONOVALENT 12+ (Pfizer) 12/21/2020, 01/06/2021, 10/15/2021    DTAP (<7y) 09/23/1997    Flu, Unspecified 09/27/2021    HIB (PRP-T) 1992, 04/12/1993, 06/15/1993    HPV9 07/29/2016, 08/17/2017, 07/24/2018    HepB 1990, 1990, 09/29/1993    Historical DTP/aP 1992, 04/12/1993, 04/27/1994    Influenza (IIV3) PF 11/20/2007, 10/25/2008, 09/24/2009, 10/26/2010, 10/22/2011, 09/15/2020    Influenza Vaccine >6 months,quad, PF 11/06/2017, 10/24/2019, 09/20/2021, 09/26/2023    Influenza, seasonal, injectable, PF 11/11/2015, 10/27/2016    Influenza,INJ,MDCK,PF,Quad >6mo(Flucelvax) 10/05/2022    MMR 01/21/1994, 09/23/1997    Mantoux Tuberculin Skin Test 09/29/1993    Meningococcal ACWY (Menactra ) 08/12/2005    OPV, trivalent, live 1992, 04/12/1993, 04/27/1994, 09/23/1997    TD,PF 7+ (Tenivac) 08/12/2005    TDAP (Adacel,Boostrix) 08/12/2005, 01/29/2015, 09/24/2021, 10/05/2022    Varicella 07/28/1998       Provider: JAS Orozco CNM    Date:  5/26/2024  Time:  8:51 AM

## 2024-05-26 NOTE — OP NOTE
NAME:  Praveean Hernandez   RECORD # [id number]     DATE OF SERVICE: 2024     PREOPERATIVE DIAGNOSIS:   Term pregnancy @ 38w1d  Arrest of dilation  Non-reassuring fetal heart tones  Pre-eclampsia without severe features  BPP 6/8  Suspected macrosomia  BMI 45  Migraines without aura  Asthma    POSTOPERATIVE DIAGNOSIS:   Same  Uterine atony    PROCEDURE: Low transverse  section    SURGEON:  Amy Cullen MD   ASSISTANT: Mimi Rowland CNM  ANESTHESIA:  epidural  ANTIBIOTICS: Ancef, Azithromycin  ESTIMATED BLOOD LOSS: 742mL  DRAINS: Mohr catheter.  COMPLICATIONS: Uterine atony, s/p TXA prior to incision, IV pitocin, intrauterine pitocin, 2cm inferior extension from left corner of hysterotomy  SPECIMENS: cord blood    FINDINGS: Normal uterus, tubes and ovaries bilateral. Live male infant born with Apgars of 8 at one minute, 9 at 5 minutes,  weight 8lbs 6.4oz    PROCEDURE:  Patient was met preoperatively where we discussed the procedure and the risks associated with the procedure.  She understood these to include but not limited to injury to adjacent organs including bowel, bladder, ureter, infection and bleeding. Understanding these risks her consents were signed.      She was brought to the operating room in stable condition.  After dosing of her epidural, fetal heart tones were checked and were stable. She was carefully prepped including vaginal prep and draped in sterile fashion for the procedure.  A timeout was then performed.      A Pfannenstiel skin incision was made and carried down to the rectus fascia which was incised in the midline and carried out bilaterally.  The superior and inferior aspects of the rectus fascia were elevated up and the underlying rectus muscles dissected off with sharp and blunt techniques.  The rectus muscles were now  at the midline and the peritoneum was identified and entered bluntly.  This incision was then extended. A bladder blade was introduced. A low transverse  uterine incision was made revealing clear amniotic fluid.  The baby's head was then delivered. The remainder of the infant easily delivered. The cord was clamped x 2 and cut and the infant handed off to waiting nursing personnel.    The placenta was then spontaneously delivered with cord traction.  The uterus was cleared of all clot and debris and exteriorized. The uterine incision was then closed with 0 monocryl in a single layer, running locked fashion. A second, locked imbricating layer was then placed using 0 monocryl from the left corner of the hysterotomy to mid hysterotomy. Complete uterine atony remained after IV pitocin and fundal massage. Intrauterine pitocin 10u was given. Uterine tone improved. The pericolic gutters were cleared of all clots and debris.  The uterine incision was again inspected and noted to be hemostatic.  The uterus was returned to the anatomic position and the hysterotomy was again inspected for hemostasis. The rectus muscles were made hemostatic with the use of electrocautery.  The fascia was brought together with 0 PDS in running fashion. The subcutaneous tissues were irrigated, made hemostatic with use of electrocautery and brought together with 3-0 plain gut in 2 horizontal running layers.  Skin was closed with 4-0 monocryl and covered with steri strips and a pressure dressing. Patient tolerated this procedure well.  Sponge, lap and needle counts were correct x two.    Amy Cullen MD  Ob/Gyn  [facility]

## 2024-05-27 VITALS
HEART RATE: 98 BPM | BODY MASS INDEX: 46.43 KG/M2 | SYSTOLIC BLOOD PRESSURE: 131 MMHG | WEIGHT: 262.02 LBS | HEIGHT: 63 IN | OXYGEN SATURATION: 99 % | RESPIRATION RATE: 18 BRPM | DIASTOLIC BLOOD PRESSURE: 80 MMHG | TEMPERATURE: 98.1 F

## 2024-05-27 PROBLEM — O11.9 CHRONIC HYPERTENSION WITH SUPERIMPOSED PREECLAMPSIA: Status: ACTIVE | Noted: 2024-05-27

## 2024-05-27 PROCEDURE — 250N000011 HC RX IP 250 OP 636: Performed by: OBSTETRICS & GYNECOLOGY

## 2024-05-27 PROCEDURE — 250N000013 HC RX MED GY IP 250 OP 250 PS 637: Performed by: ADVANCED PRACTICE MIDWIFE

## 2024-05-27 PROCEDURE — 250N000013 HC RX MED GY IP 250 OP 250 PS 637: Performed by: OBSTETRICS & GYNECOLOGY

## 2024-05-27 RX ORDER — IBUPROFEN 800 MG/1
800 TABLET, FILM COATED ORAL EVERY 6 HOURS PRN
Qty: 30 TABLET | Refills: 0 | Status: SHIPPED | OUTPATIENT
Start: 2024-05-27

## 2024-05-27 RX ORDER — FERROUS SULFATE 325(65) MG
325 TABLET ORAL EVERY OTHER DAY
Qty: 60 TABLET | Refills: 0 | Status: SHIPPED | OUTPATIENT
Start: 2024-05-27

## 2024-05-27 RX ORDER — FUROSEMIDE 40 MG
40 TABLET ORAL ONCE
Status: COMPLETED | OUTPATIENT
Start: 2024-05-27 | End: 2024-05-27

## 2024-05-27 RX ORDER — AMOXICILLIN 250 MG
1-2 CAPSULE ORAL 2 TIMES DAILY PRN
Qty: 30 TABLET | Refills: 0 | Status: SHIPPED | OUTPATIENT
Start: 2024-05-27

## 2024-05-27 RX ORDER — OXYCODONE HYDROCHLORIDE 5 MG/1
5 TABLET ORAL EVERY 4 HOURS PRN
Qty: 15 TABLET | Refills: 0 | Status: SHIPPED | OUTPATIENT
Start: 2024-05-27

## 2024-05-27 RX ADMIN — ENOXAPARIN SODIUM 40 MG: 40 INJECTION SUBCUTANEOUS at 10:11

## 2024-05-27 RX ADMIN — OXYCODONE HYDROCHLORIDE 5 MG: 5 TABLET ORAL at 01:32

## 2024-05-27 RX ADMIN — FERROUS SULFATE TAB 325 MG (65 MG ELEMENTAL FE) 325 MG: 325 (65 FE) TAB at 10:11

## 2024-05-27 RX ADMIN — IBUPROFEN 800 MG: 800 TABLET ORAL at 12:30

## 2024-05-27 RX ADMIN — DOCUSATE SODIUM 50 MG AND SENNOSIDES 8.6 MG 2 TABLET: 8.6; 5 TABLET, FILM COATED ORAL at 10:11

## 2024-05-27 RX ADMIN — METOCLOPRAMIDE 10 MG: 10 TABLET ORAL at 12:30

## 2024-05-27 RX ADMIN — IBUPROFEN 800 MG: 800 TABLET ORAL at 06:16

## 2024-05-27 RX ADMIN — OXYCODONE HYDROCHLORIDE 5 MG: 5 TABLET ORAL at 05:51

## 2024-05-27 RX ADMIN — OXYCODONE HYDROCHLORIDE 5 MG: 5 TABLET ORAL at 13:26

## 2024-05-27 RX ADMIN — IBUPROFEN 800 MG: 800 TABLET ORAL at 00:22

## 2024-05-27 RX ADMIN — ACETAMINOPHEN 975 MG: 325 TABLET ORAL at 05:50

## 2024-05-27 RX ADMIN — FUROSEMIDE 40 MG: 40 TABLET ORAL at 12:30

## 2024-05-27 RX ADMIN — ACETAMINOPHEN 975 MG: 325 TABLET ORAL at 12:52

## 2024-05-27 ASSESSMENT — ACTIVITIES OF DAILY LIVING (ADL)
ADLS_ACUITY_SCORE: 18

## 2024-05-27 NOTE — PROGRESS NOTES
Patient desires to discharge today. States she feels she can rest and heal better at home. No concerns noted.

## 2024-05-27 NOTE — PROGRESS NOTES
Patient reports intermittent SOB and chest pressure both at rest and with activity since yesterday. Denies any other complaints. Patient is Alert and Oriented X4. Denies headache, blurred vision, epigastric/RUQ pain, lightheadedness, dizziness, chest pain or tenderness to palpation, numbness or tingling. Homans negative bilaterally. Noted 1+ to 2+ BLE edema. No redness or increased warmth noted at BLE. Lungs are CTA throughout. Patient has been up ad warren in room with many visitors. Voiding spontaneously without difficulty. Lochia normal. Fundus is firm and U/1. Notified Dr. Farmer with patient's complaint. CXR ordered. Updated patient at significant other.

## 2024-05-27 NOTE — PLAN OF CARE
"Postpartum assessment is within normal limits. Blood pressure remains within normal limits. Denies signs and symptoms of pre-eclampsia. Pain managed with scheduled Ibuprofen, Tylenol and PRN Oxycodone. [place]  depression scale completed. Patient desires to go home today.     Reviewed \"know your numbers\" and \"checking your blood pressure at home\" information with Praveena. Patient has a blood pressure cuff at home.     Patient utilized double electric breast pump today and was visited by Lactation consultant as well.     Jonelle Vela RN on 2024 at 1:55 PM        "

## 2024-05-27 NOTE — PROGRESS NOTES
Patient did not sleep well as was feeling anxious about being a new mom and caring for baby. Positive reinforcement, emotional support and reassurance was provided. Patient stated she thinks her chest pressure and SOB last night was related to anxiety. Discussed self care and relaxation techniques. Also discussed baby blues vs PPD/Anxiety. Discussed need to complete mood assessment and rationale. Instructed patient to report if SOB or chest pressure returned. Also instructed to report any new symptoms.

## 2024-05-27 NOTE — PROGRESS NOTES
Report obtained. Introduction done. Assumed patient care. Patient is pleasant and cooperative and denies needs or concerns at this time. Several visitors are present at bedside. Will continue to monitor, assess and provide holistic care.

## 2024-05-27 NOTE — DISCHARGE INSTRUCTIONS
Warning Signs after Having a Baby    Keep this paper on your fridge or somewhere else where you can see it.    Call your provider if you have any of these symptoms up to 12 weeks after having your baby.    Thoughts of hurting yourself or your baby  Pain in your chest or trouble breathing  Severe headache not helped by pain medicine  Eyesight concerns (blurry vision, seeing spots or flashes of light, other changes to eyesight)  Fainting, shaking or other signs of a seizure    Call 9-1-1 if you feel that it is an emergency.     The symptoms below can happen to anyone after giving birth. They can be very serious. Call your provider if you have any of these warning signs.    My provider s phone number: _______________________    Losing too much blood (hemorrhage)    Call your provider if you soak through a pad in less than an hour or pass blood clots bigger than a golf ball. These may be signs that you are bleeding too much.    Blood clots in the legs or lungs    After you give birth, your body naturally clots its blood to help prevent blood loss. Sometimes this increased clotting can happen in other areas of the body, like the legs or lungs. This can block your blood flow and be very dangerous.     Call your provider if you:  Have a red, swollen spot on the back of your leg that is warm or painful when you touch it.   Are coughing up blood.     Infection    Call your provider if you have any of these symptoms:  Fever of 100.4 F (38 C) or higher.  Pain or redness around your stitches if you had an incision.   Any yellow, white, or green fluid coming from places where you had stitches or surgery.    Mood Problems (postpartum depression)    Many people feel sad or have mood changes after having a baby. But for some people, these mood swings are worse.     Call your provider right away if you feel so anxious or nervous that you can't care for yourself or your baby.    Preeclampsia (high blood pressure)    Even if you  didn't have high blood pressure when you were pregnant, you are at risk for the high blood pressure disease called preeclampsia. This risk can last up to 12 weeks after giving birth.     Call your provider if you have:   Pain on your right side under your rib cage  Sudden swelling in the hands and face    Remember: You know your body. If something doesn't feel right, get medical help.     For informational purposes only. Not to replace the advice of your health care provider. Copyright 2020 [facility]. All rights reserved. Clinically reviewed by Nehal Graham, RNC-OB, MSN. [employer] 002606 - Rev 02/23.

## 2024-05-27 NOTE — DISCHARGE SUMMARY
OB Discharge Summary      Date:  2024    Name:  Praveena Hernandez  :  1990  MRN:  [id number]      Admission Date:  2024  Delivery Date: 2024   Gestational Age at Delivery:  38w1d  Discharge Date:  2024    Principal Diagnosis:    Patient Active Problem List   Diagnosis    Supervision of high-risk pregnancy, first trimester    BMI 40.0-44.9, adult (H)    At risk for venous thromboembolism (VTE)    Pre-diabetes    At risk for hypertension    Glucose intolerance (impaired glucose tolerance)    Encounter for triage in pregnant patient    Gestational hypertension    Chronic hypertension with superimposed preeclampsia   Anemia secondary to acute blood loss      Conditions complicating Pregnancy:   1) Pre pregnancy BMI >40  2) Borderline pre diabetes, A1c 5.4. Failed early 1hr GCT, passed 3hr GTT x 2  3) Migraines without aura  4) Asthma  5) Suspected macrosomia  6) gHTN with pre eclampsia diagnosed on admission    Indication for :  NRFS  Indication for Induction:  gHTN with super imposed pre-e, non reassuring  testing in clinic      Condition at Discharge:  Stable    Discharge Medications:      Review of your medicines        START taking        Dose / Directions   ferrous sulfate 325 (65 Fe) MG tablet  Commonly known as: FEROSUL  Used for: Anemia due to blood loss, acute      Dose: 325 mg  Take 1 tablet (325 mg) by mouth every other day  Quantity: 60 tablet  Refills: 0     ibuprofen 800 MG tablet  Commonly known as: ADVIL/MOTRIN  Used for: S/P  section      Dose: 800 mg  Take 1 tablet (800 mg) by mouth every 6 hours as needed for moderate pain  Quantity: 30 tablet  Refills: 0     oxyCODONE 5 MG tablet  Commonly known as: ROXICODONE  Used for: S/P  section      Dose: 5 mg  Take 1 tablet (5 mg) by mouth every 4 hours as needed for breakthrough pain  Quantity: 15 tablet  Refills: 0     senna-docusate 8.6-50 MG tablet  Commonly known as: SENOKOT-S/PERICOLACE  Used for:  S/P  section      Dose: 1-2 tablet  Take 1-2 tablets by mouth 2 times daily as needed for constipation  Quantity: 30 tablet  Refills: 0            CONTINUE these medicines which have NOT CHANGED        Dose / Directions   albuterol 108 (90 Base) MCG/ACT inhaler  Commonly known as: PROAIR HFA/PROVENTIL HFA/VENTOLIN HFA      Dose: 1-2 puff  Inhale 1-2 puffs into the lungs  Refills: 0     OMEGA 3 PO      Dose: 1 capsule  Take 1 capsule by mouth daily  Refills: 0     PRENATAL VITAMIN PO      Dose: 1 tablet  Take 1 tablet by mouth daily  Refills: 0            STOP taking      aspirin 81 MG chewable tablet  Commonly known as: ASA        azelaic acid 15 % external gel  Commonly known as: FINACIA        doxylamine 25 MG Tabs tablet  Commonly known as: UNISOM        INOSITOL PO        pyridOXINE 25 MG tablet  Commonly known as: VITAMIN B6                  Where to get your medicines        These medications were sent to [id number] [place], [place] - [address] [facility]  [address] [place] [id number]      Phone: [phone]   ferrous sulfate 325 (65 Fe) MG tablet  ibuprofen 800 MG tablet  oxyCODONE 5 MG tablet  senna-docusate 8.6-50 MG tablet          Discharge Plan:    Follow up with /CECILIA:  3 days with MD   Patient Instructions:      Physical activity: As tolerated    Diet:  as tolerated    Medication: see above    Other:        Physician/JACEYM: Jessica Junior CNM    Name:  Praveena Hernandez  :  1990  MRN:  [id number]

## 2024-05-27 NOTE — DISCHARGE SUMMARY
Birth certificate completed. ROP instructions reviewed with family; they are bringing form home with them as there is no Not[facility] here now.     All discharge education reviewed including post  care, warning signs and when to call the doctor or 911.     Follow up to be scheduled within 3-5 days from discharge. Patient agrees and feels confident she can call and make an appointment for close monitoring of blood pressure.     Jonelle Vela RN on 2024 at 2:46 PM

## 2024-05-27 NOTE — PLAN OF CARE
Problem: Adult Inpatient Plan of Care  Goal: Absence of Hospital-Acquired Illness or Injury  Intervention: Prevent Skin Injury  Recent Flowsheet Documentation  Taken 5/27/2024 0530 by Karina Blakely RN  Body Position: position changed independently  Taken 5/27/2024 0000 by Karina Blakely RN  Body Position: position changed independently  Taken 5/26/2024 2200 by Karina Blakely RN  Body Position: position changed independently  Taken 5/26/2024 2000 by Karina Blakely RN  Body Position: position changed independently  Intervention: Prevent Infection  Recent Flowsheet Documentation  Taken 5/27/2024 0530 by Karina Blakely RN  Infection Prevention: hand hygiene promoted  Taken 5/27/2024 0000 by Karina Blakely RN  Infection Prevention: hand hygiene promoted  Taken 5/26/2024 2000 by Karina Blakely RN  Infection Prevention: hand hygiene promoted  Goal: Optimal Comfort and Wellbeing  Intervention: Provide Person-Centered Care  Recent Flowsheet Documentation  Taken 5/27/2024 0530 by Karina Blakely RN  Trust Relationship/Rapport:   care explained   choices provided   emotional support provided   empathic listening provided   questions answered   questions encouraged   reassurance provided   thoughts/feelings acknowledged  Taken 5/27/2024 0000 by Karina Blakely RN  Trust Relationship/Rapport:   care explained   choices provided   emotional support provided   empathic listening provided   questions answered   questions encouraged   reassurance provided   thoughts/feelings acknowledged  Taken 5/26/2024 2000 by Karina Blakely RN  Trust Relationship/Rapport:   care explained   choices provided   emotional support provided   empathic listening provided   questions answered   questions encouraged   reassurance provided   thoughts/feelings acknowledged     Problem: Labor  Goal: Stable Fetal Wellbeing  Intervention: Promote and Monitor Fetal Wellbeing  Recent Flowsheet Documentation  Taken 5/27/2024 0530 by Reddy  Karina CURIEL RN  Body Position: position changed independently  Taken 2024 0000 by Karina Blakely RN  Body Position: position changed independently  Taken 2024 2200 by Karina Blakely RN  Body Position: position changed independently  Taken 2024 by Karina Blakely RN  Body Position: position changed independently  Goal: Absence of Infection Signs and Symptoms  Intervention: Prevent or Manage Infection  Recent Flowsheet Documentation  Taken 2024 0530 by Kairna Blakely RN  Infection Prevention: hand hygiene promoted  Taken 2024 0000 by Karina Blakely RN  Infection Prevention: hand hygiene promoted  Taken 2024 by Karina Blakely RN  Infection Prevention: hand hygiene promoted  Goal: Acceptable Pain Control  Intervention: Support Labor Pain Coping and Management  Recent Flowsheet Documentation  Taken 2024 0530 by Karina Blakely RN  Complementary Therapy: essential oils utilized  Taken 2024 by Karina Blakely RN  Complementary Therapy: essential oils utilized  Taken 2024 by Karina Blakely RN  Complementary Therapy: essential oils utilized     Problem: Postpartum ( Delivery)  Goal: Successful Parent Role Transition  Intervention: Support Parent Role Transition  Recent Flowsheet Documentation  Taken 2024 0530 by Karina Blakely RN  Supportive Measures:   active listening utilized   goal-setting facilitated   positive reinforcement provided   relaxation techniques promoted   self-care encouraged   verbalization of feelings encouraged   decision-making supported   guided imagery facilitated   problem-solving facilitated  Taken 2024 0000 by Karina Blakely RN  Supportive Measures:   active listening utilized   goal-setting facilitated   positive reinforcement provided   relaxation techniques promoted   self-care encouraged   verbalization of feelings encouraged   decision-making supported   guided imagery facilitated    problem-solving facilitated  Taken 5/26/2024 2000 by Karina Blakely, RN  Supportive Measures:   active listening utilized   goal-setting facilitated   positive reinforcement provided   relaxation techniques promoted   self-care encouraged   verbalization of feelings encouraged  Goal: Effective Bowel Elimination  Intervention: Enhance Bowel Motility and Elimination  Recent Flowsheet Documentation  Taken 5/27/2024 0530 by Karina Blakely, ARISTIDES  Bowel Elimination Promotion:   adequate fluid intake promoted   ambulation promoted  Taken 5/27/2024 0000 by Karina Blakely RN  Bowel Elimination Promotion:   adequate fluid intake promoted   ambulation promoted  Taken 5/26/2024 2000 by Karina Blakely RN  Bowel Elimination Promotion:   adequate fluid intake promoted   ambulation promoted  Goal: Anesthesia/Sedation Recovery  Intervention: Optimize Anesthesia Recovery  Recent Flowsheet Documentation  Taken 5/26/2024 2000 by Karina Blakely RN  Level Incentive Spirometer (mL): 1000  Patient Tolerance (IS): good  Goal: Optimal Pain Control and Function  Intervention: Prevent or Manage Pain  Recent Flowsheet Documentation  Taken 5/27/2024 0530 by Karina Blakely, RN  Complementary Therapy: essential oils utilized  Perineal Care:   perineal hygiene encouraged   perineal spray bottle/warm water use encouraged   absorbent brief/pad changed  Taken 5/27/2024 0000 by Karina Blakely, RN  Complementary Therapy: essential oils utilized  Perineal Care:   perineal hygiene encouraged   perineal spray bottle/warm water use encouraged   absorbent brief/pad changed  Taken 5/26/2024 2200 by Karina Blakely, RN  Perineal Care:   perineal hygiene encouraged   perineal spray bottle/warm water use encouraged   absorbent brief/pad changed  Taken 5/26/2024 2000 by Karina Blakely, RN  Complementary Therapy: essential oils utilized  Perineal Care:   perineal hygiene encouraged   perineal spray bottle/warm water use encouraged   absorbent brief/pad  changed  Goal: Effective Oxygenation and Ventilation  Intervention: Optimize Oxygenation and Ventilation  Recent Flowsheet Documentation  Taken 5/27/2024 0530 by Karina Blakely RN  Head of Bed (HOB) Positioning: HOB at 30-45 degrees  Taken 5/27/2024 0000 by Karina Blakely RN  Head of Bed (HOB) Positioning: HOB at 30-45 degrees  Taken 5/26/2024 2200 by Karina Blakely RN  Head of Bed (HOB) Positioning: HOB at 30-45 degrees  Taken 5/26/2024 2000 by Karina Blakely RN  Head of Bed (HOB) Positioning: HOB at 30-45 degrees     Problem: Breastfeeding  Goal: Effective Breastfeeding  Intervention: Promote Breast Care and Comfort  Recent Flowsheet Documentation  Taken 5/27/2024 0530 by Karina Blakely RN  Breast Care: hand expression utilized  Taken 5/27/2024 0000 by Karina Blakely RN  Breast Care: hand expression utilized  Taken 5/26/2024 2000 by Karina Blakely RN  Breast Care: hand expression utilized  Intervention: Support Exclusive Breastfeeding Success  Recent Flowsheet Documentation  Taken 5/27/2024 0530 by Karina Blakely RN  Supportive Measures:   active listening utilized   goal-setting facilitated   positive reinforcement provided   relaxation techniques promoted   self-care encouraged   verbalization of feelings encouraged   decision-making supported   guided imagery facilitated   problem-solving facilitated  Taken 5/27/2024 0000 by Karina Blakely RN  Supportive Measures:   active listening utilized   goal-setting facilitated   positive reinforcement provided   relaxation techniques promoted   self-care encouraged   verbalization of feelings encouraged   decision-making supported   guided imagery facilitated   problem-solving facilitated  Taken 5/26/2024 2000 by Karina Blakely RN  Supportive Measures:   active listening utilized   goal-setting facilitated   positive reinforcement provided   relaxation techniques promoted   self-care encouraged   verbalization of feelings encouraged   Goal Outcome  Evaluation:                      Patient will have optimal pain control.  Assess and monitor pain level per protocol. Administer scheduled and PRN analgesics as ordered. Offer and provide healing art therapies. Offer ice packs.     Patient receiving good pain control. Incorporated oxycodone into multimodal pain management. Desires order for oxycodone for discharge. Up ad warren in room and doing well. Voiding spontaneously without difficulty.    Alert and interactive, no focal deficits. No spine tenderness or deformities

## 2024-05-27 NOTE — LACTATION NOTE
"This note was copied from a baby's chart.  Lactation follow-up Note:      Hours since Delivery: 1 day 11 hours old.    Gestational Age at Delivery: 38.1 weeks.    Visit with Lactation: In the last 24 hours, infant has been to breast 9 times, has received 2-15mL of MBM and/or PDHM via bottle, has voided x4, soiled x6, and had a weight loss of 8.35% (since delivery). Mother states infant is much less spitty, and he's been obtaining a deep, comfortable latch at breast, and she has started to hear a swallowing. Mother reports infant started cluster feeding overnight. Upon entering room, mother attempting to bring infant to breast, he latched deeply onto left breast, then fell asleep; infant had 15mL of PDHM 1 hour ago. Education given on cluster feeding, and infants \"second night\"' mother was encouraged to do skin-to-skin with infant, or wake FOB so he can assist with soothing techniques. Discussed infants weight loss, and dyad following up with MD early this week to watch weight loss; mother verbalized understanding. Engorgement education given. Encouraged mother to let primary RN know if she would like lactation to return for feeding assistance or if questions arise. Mother aware of lactation resources available to her after discharging from hospital. Consulted with bedside RN to discuss pumping if infant is supplementing. Family plans to use formula for supplementation after discharging from hospital.    Plan: Skin-to-skin prior to feedings. Continue breastfeeding on-demand and/or every 2-3 hours. Track feedings and diaper output. Supplement PRN, and mother to pump for \"missed\" feedings at breast.    Has Breast Pump for Home: Yes.    Education given: [place] behaviors during first few days of life, Hunger cues, How to obtain & maintain a deep, comfortable latch, Listening & watching for swallows, How do I know if my baby is finished & getting enough, Benefits of skin-to-skin prior to feedings, Paced bottle feeding, " Importance of tracking all feedings and diaper output, Engorgement, Reverse pressure softening, Nutritive vs non-nutritive sucking, Pumping for missed feedings at breast, Burping, Cluster feeding, How to tell if breastfeeding is going well, Supplementation volumes during first few days of life, and Techniques for keeping infant actively sucking, including breast compressions.

## 2024-06-07 ENCOUNTER — TRANSFERRED RECORDS (OUTPATIENT)
Dept: HEALTH INFORMATION MANAGEMENT | Facility: CLINIC | Age: 34
End: 2024-06-07
Payer: COMMERCIAL

## 2024-06-20 ENCOUNTER — DOCUMENTATION ONLY (OUTPATIENT)
Dept: PEDIATRICS | Facility: CLINIC | Age: 34
End: 2024-06-20
Payer: COMMERCIAL

## 2024-06-20 NOTE — PROGRESS NOTES
"[facility] Pediatrics Lactation Visit     Assessment:      difficulty in feeding at breast  - Lactation [facility] Referral     Nazanin has had appropriate weight gain with both breast and bottle feeding and is now 7% above birth weight. She was able to latch well to the breast today and mom did not have pain. She transferred 1 oz total which is less than typical for a 2 week infant. We discussed that mom's milk supply is likely slightly reduced - this may be due to several factors including  delivery, blood loss at delivery and preeclampsia. We discussed strategies to help maximize transfer at the breast/ increase milk supply. Will plan to follow up in 2 weeks with another lactation appointment.               Plan:        Patient Instructions   Continue to breastfeed on demand, at least 8 times a day. Many breast fed babies nurse 12 times per day or more.      Offer both sides every time, and alternate which breast you start on. Latch baby deeply by making a U-shaped \"breast sandwich,\" and aim your nipple for the roof of the mouth. If baby's lips are rolled inward, flip the top lip out with your finger, and then apply gentle downward pressure to the chin to help the lips flange out like \"fish lips.\" If you have pain that lasts beyond the initial latch-on, always restart. When sucking/swallowing frequency starts to slow down, do breast compressions/massage and tickle baby's feet to keep him alert with feeding. A diaper change between sides can be helpful to keep him alert.     Supplementation plan: Continue to supplement according to his feeding cues. See chart below for typical intake by age.        Recommended to pump: Aim for good breast stimulation at least 8 times per day - if you are able to pump some of the time after nursing, great. Balance this with other priorities like resting, holding Nazanin and having time for yourself.      Expect at least 6 wet diapers per day.      It is recommended for all " "breast fed infants to take Vitamin D 400 IU (10 mcg) daily. This is available over the counter in a concentrated drop (my favorite as it is easier to give) or a 1 ml daily dose.      Return in about 1 week (around 6/25/2024) for lactation follow up .     Average Infant Milk Intake by Age     Age Average milk volume per feeding (mL) Average milk volume per feeding (oz) Average 24 hour milk intake (mL) Average 24 hour milk intake (oz)   Day 1 Few drops - 5mL < tsp Up to 30 mL Up to 1 oz   Day 2 5 - 15 mL <0.5 oz - 1 TB 30 - 120 mL 1 - 4 oz   Day 3 15 - 30 mL  0.5 - 1 oz 120 - 240 mL 4 - 8 oz   Day 4 30 - 45 mL  1 - 1.5 oz 240 - 360 mL 8 - 12 oz   Day 5-7 45 - 60 mL 1.5 - 2 oz 360 - 600 mL 12 - 18 oz   Week 2-3 60 - 90 mL 2 - 3 oz 450 - 750 mL 15 - 25 oz   Months 1-6 90 - 150 mL 3 - 5 oz 750 - 1035 mL 25 - 35 oz      Nazanin took 0.6 oz from the R side and 0.4 oz on the L side (1 oz total).      -------------------------------------------------------------------------------------------------  Information for breastfeeding families on Increasing breastmilk supply      Frequent stimulation of the breasts, bybreastfeeding or by using a breast pump, during the first few days and weeks, is essential to establish an abundant breastmilk supply. If you find your milk supply is low, try the following recommendations. If you areconsistent you will likely see an improvement within a few days. Although it may take a month or more to bring your supply up to meet your baby's needs, you will see steady, gradual improvement. You will be glad that you putthe time and effort into breastfeeding and so will your baby.      More breast stimulation  Breastfeed more often, at least 8-12 times per 24 hours.   Limit the use of a pacifier (so that when the baby wants to suck, they are stimulating the breasts for milk production)  Try to get in \"one more feeding\" before you go to sleep, this can be done as a \"dream feed\" where you feed your baby " "while they sleep.  Offer both breasts at each feeding  \"Burp and switch\" using each breast twice or three times, and using different positions  \"Top up feeds\" give a short feeding in 10-20 minutes if baby seems hungry  Empty your breasts well by massaging while the baby is feeding  Assure the baby is completely emptying your breasts at each feeding  Try breast massage/ compression - pushing milk tobaby during a feeding     Avoid these things that are known to reduce breastmilk supply  Smoking  Caffeine (in excess - it is ok to drink your morning coffee!)   Birth control pills and injections  Decongestants, antihistamines like Benadryl, NyQuil or Sudafed. If you need relief for allergies, Zyrtec or Claritin are better choices that are less likely to decreasesupply.   Severe weight loss diets. A vegan or \"keto\" diet may also decrease supply due to inadequate protein or carbohydrates.   Mints, parsley, julienne in excessive amounts (avoid Altoid mints or mint tea, for example)     Use a breast pump  Consider use of a hospital grade breast pump with a double kit  Pump after feedings, up to 20 minutes after you finish nursing (an empty breast makes more milk)  Rest 10-15 minutes prior to pumping, eat and drink something  Apply warmth to your breasts and massage before beginning to pump  Try \"power pumping\".Pumping up to 12 x a day for 2-3 days after a feeding, even for a short time OR Try pumping for 10min, resting for 10 min, pumping 10 min etc for an hour once or more times per day. It can help to relax and watch an hour-long TV show while you try this.    To make pumping easier, you can rinse and refrigerate your pump parts between feedings, storing them in a Ziploc bag or Tupperware container. Wash them well at least twice per day. If your baby is premature or immunocompromised it is a better practice to wash them after each use. Most pump parts can be washed in a  on the top rack (verify with the  " "first).    A \"hands-free\" pumping bra can make pumping easier. This frees your hands while you pump to do breast massage or to eat or drink while you pump.   A portable pump + \"freemie cups\" can make pumping easier to fit into your routine. Https://freemie.com/     Condition your let-down reflex  Play relaxing music  Imagine your baby, look at pictures of your baby, smell baby clothing or baby powder  Watch videos of your baby  Always pumpin the same quiet, relaxed place, set up a routine  Do slow, deep, relaxed breathing, relax your shoulders     Mother care  Reduce stress and activity, get help  Increase fluid intake. Aim for  oz/day of fluids. Electrolytes (like in coconut water) may be helpful.   Eat nutritious meals, continue to take prenatal vitamins.   Back rubs stimulate nerves that serve the breasts (central part of the spine)  Increase skin-to-skin holding time with your baby, relax together  Take a warm, bath, read, meditate, and empty your mind of tasks that need to be done     Food and medications  Eat a bowl of cooked oatmeal daily  Olvera's yeast or ground flax seeds, 1 teaspoon one or more times daily (try mixing into oatmeal or baking into lactation cookies). Olvera's yeast is not recommended for women with hypertension or a history of hypertension.   \"Moringa\" or \"Malunggay\" is an herb that is a \"super food\" and is well tolerated and can help increase supply. This herb is available through GoLacta supplements, [employer] (use promo code PRO15 for 15% off) or other suppliers as a powder (to mix into smoothies, for example) or capsules. Herbs unfortunately are not regulated by the FDA so you have to do your own homework and choose a brand that seems reputable.   Goat's Rue is an herbal remedy intended to help increase the glandular tissue in women's breasts. This can be a powerful galactogogue (substance to increase milk supply). Goat's rue is not recommended for women with a history of " hypoglycemia or who are taking insulin.   Fenugreek preparations can help some increase supply, though anecdotally others have found that it does not help their supply or even decreases supply. Because of this, I do not routinely recommend it. Use of this herb has not been formally studied. Doses of 3-5 capsules (580-610 mg) three times per day are commonly recommended. Avoid fenugreek if you are diabetic, hypoglycemic, asthmatic or allergic to peanuts or other legumes or beans. Taken as directed, it may cause a faint maple body odor. That is to be expected and means that the herb is doing its job. To read more about fenugreek, go to http://www.breastfeeding.com/all_about/all_about_fenugreek.html  Blessed thistle or other herbs or beverages such as Mother's Milk Tea taken as directed on the package. A reliable sources of herbs and herbal blends is Mother Love Herbals and [employer] Her[employer].  Prescription medication sometimes help increase milk supply. Metaclopromide (Reglan) has been used with limited success. Domperidone has been used with more success, but is not FDA approved in the [place].      The Josselyn brand has several good options - Milkapalooza (moringa based), Liquid Gold (Goat's rue based), and Cash Cow (Contains both moringa and goat's rue).      Keep records  It is important to keep a daily log with the number of nursing + pumping sessions, amount obtained amount you are having to supplement your baby and 24 hour totals, this amount is more important that the pumped amount at each session. This will help you see your progress over the days.  Keep in touch with your health care provider so he/she can monitor your progress over the days and modify advice if necessary.      Retained placenta  If you are not seeing improvement and you are having any heavy bleeding, discuss the possibility of retained placental fragments with your MD or midwife. Small bits of the placenta can secrete enough hormones to prevent the  milk from coming in.     Low thyroid  Have your health care provider check your thyroid levels. Low thyroid can affect milk supply. If you have been taking thyroid medication, have your levels checked after delivery, you may need your medication adjusted.      Otherresources: http://www.lowmilksupply.org     [place] Hand Expression Video [email or web address]      Maximizing Milk Production Video;[email or web address]                  Return in about 1 week (around 6/25/2024) for lactation follow up .        SUBJECTIVE:      Nazanin is here today with mom, Praveena, for lactation support. He is a 3 week old male born at Gestational Age: 38w1d now 26 days.    He is doing well. He has gained 17.9 oz since last visit 15 days ago. He has gained approximately 1.1 oz per day over the past 15 days and is now 7% from birth weight.      8 nursed pumped 10 nursed 12 nursed 2 nursed bottle (2 oz) 3:30 nursed 5 nursed   Nursing around the clock except night time. Using some breast milk overnight and then some formula when needed.      She got 1.5 oz when pumped last night. Pumped at midnight and then at 4 or 5 am - 3 oz per pump.      He takes 3 oz of formula, mom concerned because she's pumping just under what he needs around that time.      He spits up after most feeds, usually within 1 minutes of feeding. He likely spits up about 10 mls at a time.      Baby is nursing every 2-3 hours for about 40 minutes per session. He takes about 2 oz after breast feeding most of the time.   Mother reports hearing audible swallows.   Baby feeds about 8+ times in 24 hours.   Baby is supplemented with formula, about 6 oz in 24 hours.    Mom is also pumping about 3-5 times per day and gets about 1.5 - 3 oz per pumping session.  Number of wet diapers in 24 hours: 6+  Number of stools in 24 hours: 6+  Color and consistency of stools: yellow  Mom noticed her breasts grew  "larger and areolas darkened during pregnancy and she noticed primary engorgement when her milk came in on day 8-9.     Using 24 mm flanges - Spectra. Has 21 mm inserts.         Breastfeeding Goals: Would like to wean off of formula, just use breast milk.      Previous Breastfeeding Experience: First baby.   Breast-surgery: None  Maternal medications: Iron, labetalol, inositol, fish oil   Maternal Health conditions:  delivery due to preeclampsia. Had a harder time stopping bleeding. Hemoglobin dropped some following delivery. Latched right away in the hospital, did start supplementing with donor milk fairly early on. In the hospital for 5 -6 days.      Hospital course:  Stable hospital course.      No results found for any visits on 24.  Current Medications   No current outpatient medications on file.     Past Medical History   No past medical history on file.     Past Surgical History   No past surgical history on file.     Family History   No family history on file.           Primary care provider: Su Robles     OBJECTIVE:     Mother:   Nipples are everted, the areola is compressible, the breast is soft and full.      Sore nipples: Some soreness, mainly on the L.      Maternal depression screening: Doing well  EPDS: Referral to maternal PCP not made     Infant:      Age today: 26 days     Pulse 152   Temp 99  F (37.2  C) (Rectal)   Resp 40   Ht 1' 10\" (0.559 m)   Wt 8 lb 15.7 oz (4.074 kg)   HC 14.5\" (36.8 cm)   SpO2 99%   BMI 13.05 kg/m          Weight:       Wt Readings from Last 3 Encounters:   24 8 lb 15.7 oz (4.074 kg) (39%, Z= -0.28)*   24 7 lb 14 oz (3.572 kg) (42%, Z= -0.21)*   24 7 lb 14 oz (3.572 kg) (56%, Z= 0.15)*      * Growth percentiles are based on WHO (Boys, 0-2 years) data.         Birthweight:  8 lbs 6.39 oz.   Today's weight:  8 lbs 15.7 oz This is 7% from birth weight.         Test weights:     LEFT side: 0.6 oz  RIGHT side: 0.4 oz     TOTAL " transfer:  1 oz         Feeding assessment:      Digital suck assessment:  Infant draws consultant's finger into mouth, palate intact, tongue over gums, normal frenulum.      Baby can hold suction with tongue while at the breast.      Alignment: Baby's head was aligned with its trunk. Baby did face mother. Baby was in football position today.      Areolar Grasp: Baby was able to open mouth wide. Baby's lips were not pursed. Baby's lips did flange outward. Tongue was visible just barely over bottom lip. Baby had complete seal.      Areolar Compression: Baby made rhythmic motion. There were no clicking or smacking sounds. There was no severe nipple discomfort.  Nipples appeared round after feeding.     Audible swallowing: Baby made quiet sounds of swallowing: There was an increase in frequency after milk ejection reflex. The milk ejection reflex is appropriate and milk supply appears adequate.      PHYSICAL EXAM     Gen: Alert, no acute distress.   Head: Anterior fontanelle flat and soft.   Mouth:Lips pink. Oral mucosa moist. Tongue midline (good lateralization, movement, and lift; able to extend pass lower gumline).  Palate intact. Coordinated suck.  Lungs: Clear to auscultation bilaterally.   Cardiac: Regular regular rate and rhythm, S1S2, no murmurs.  Abdomen: Soft, nontender, bowel sounds present, no hepatosplenomegaly or mass palpable. Umbilicus dry with no erythema or drainage.   : Adama stage 1 male genitalia  Skin: Intact, dry, appropriate coloring for ethnicity, no jaundice.   Neuro: Appropriate muscle tone.     The visit lasted a total of 60 minutes that I spent on this visit today. This time includes pre-charting, review of the chart, and face to face time with the patient.      Completed by:   RIANA Gutierrez, IBCLC, [facility], Pediatrics.  6/18/2024 3:42 PM

## 2024-11-03 ENCOUNTER — HEALTH MAINTENANCE LETTER (OUTPATIENT)
Age: 34
End: 2024-11-03

## 2024-12-03 PROBLEM — K59.00 CONSTIPATION: Status: ACTIVE | Noted: 2024-12-03

## 2024-12-03 PROBLEM — N94.9 DISORDER OF FEMALE GENITAL ORGAN: Status: ACTIVE | Noted: 2024-12-03

## 2024-12-03 PROBLEM — N94.10 PAIN IN FEMALE GENITALIA ON INTERCOURSE: Status: ACTIVE | Noted: 2024-12-03

## 2024-12-03 PROBLEM — Z83.3 FAMILY HISTORY OF DIABETES MELLITUS: Status: ACTIVE | Noted: 2017-08-17

## 2024-12-03 PROBLEM — Z83.49 FAMILY HISTORY OF THYROID DISEASE: Status: ACTIVE | Noted: 2017-08-17

## 2024-12-03 PROBLEM — M62.89 PELVIC FLOOR DYSFUNCTION: Status: ACTIVE | Noted: 2024-12-03

## 2024-12-03 RX ORDER — LABETALOL 100 MG/1
1 TABLET, FILM COATED ORAL
COMMUNITY
Start: 2024-05-30 | End: 2024-12-11

## 2024-12-11 ENCOUNTER — OFFICE VISIT (OUTPATIENT)
Dept: FAMILY MEDICINE | Facility: CLINIC | Age: 34
End: 2024-12-11
Payer: COMMERCIAL

## 2024-12-11 VITALS
OXYGEN SATURATION: 98 % | TEMPERATURE: 98 F | SYSTOLIC BLOOD PRESSURE: 116 MMHG | RESPIRATION RATE: 16 BRPM | HEART RATE: 92 BPM | DIASTOLIC BLOOD PRESSURE: 82 MMHG | HEIGHT: 63 IN | BODY MASS INDEX: 41 KG/M2 | WEIGHT: 231.38 LBS

## 2024-12-11 DIAGNOSIS — Z00.00 ROUTINE GENERAL MEDICAL EXAMINATION AT A HEALTH CARE FACILITY: ICD-10-CM

## 2024-12-11 DIAGNOSIS — Z12.4 CERVICAL CANCER SCREENING: Primary | ICD-10-CM

## 2024-12-11 LAB
ALBUMIN SERPL BCG-MCNC: 4.3 G/DL (ref 3.5–5.2)
ALP SERPL-CCNC: 85 U/L (ref 40–150)
ALT SERPL W P-5'-P-CCNC: 25 U/L (ref 0–50)
ANION GAP SERPL CALCULATED.3IONS-SCNC: 13 MMOL/L (ref 7–15)
AST SERPL W P-5'-P-CCNC: 28 U/L (ref 0–45)
BILIRUB SERPL-MCNC: <0.2 MG/DL
BUN SERPL-MCNC: 11.3 MG/DL (ref 6–20)
CALCIUM SERPL-MCNC: 9.3 MG/DL (ref 8.8–10.4)
CHLORIDE SERPL-SCNC: 102 MMOL/L (ref 98–107)
CHOLEST SERPL-MCNC: 213 MG/DL
CREAT SERPL-MCNC: 0.65 MG/DL (ref 0.51–0.95)
EGFRCR SERPLBLD CKD-EPI 2021: >90 ML/MIN/1.73M2
ERYTHROCYTE [DISTWIDTH] IN BLOOD BY AUTOMATED COUNT: 13.4 % (ref 10–15)
EST. AVERAGE GLUCOSE BLD GHB EST-MCNC: 126 MG/DL
FASTING STATUS PATIENT QL REPORTED: NO
FASTING STATUS PATIENT QL REPORTED: NO
GLUCOSE SERPL-MCNC: 96 MG/DL (ref 70–99)
HBA1C MFR BLD: 6 % (ref 0–5.6)
HCO3 SERPL-SCNC: 23 MMOL/L (ref 22–29)
HCT VFR BLD AUTO: 40.4 % (ref 35–47)
HDLC SERPL-MCNC: 52 MG/DL
HGB BLD-MCNC: 12.7 G/DL (ref 11.7–15.7)
LDLC SERPL CALC-MCNC: 119 MG/DL
MCH RBC QN AUTO: 26.1 PG (ref 26.5–33)
MCHC RBC AUTO-ENTMCNC: 31.4 G/DL (ref 31.5–36.5)
MCV RBC AUTO: 83 FL (ref 78–100)
NONHDLC SERPL-MCNC: 161 MG/DL
PLATELET # BLD AUTO: 322 10E3/UL (ref 150–450)
POTASSIUM SERPL-SCNC: 4.2 MMOL/L (ref 3.4–5.3)
PROT SERPL-MCNC: 7.7 G/DL (ref 6.4–8.3)
RBC # BLD AUTO: 4.86 10E6/UL (ref 3.8–5.2)
SODIUM SERPL-SCNC: 138 MMOL/L (ref 135–145)
TRIGL SERPL-MCNC: 209 MG/DL
TSH SERPL DL<=0.005 MIU/L-ACNC: 1.68 UIU/ML (ref 0.3–4.2)
WBC # BLD AUTO: 8.7 10E3/UL (ref 4–11)

## 2024-12-11 PROCEDURE — 83036 HEMOGLOBIN GLYCOSYLATED A1C: CPT | Performed by: FAMILY MEDICINE

## 2024-12-11 PROCEDURE — 84443 ASSAY THYROID STIM HORMONE: CPT | Performed by: FAMILY MEDICINE

## 2024-12-11 PROCEDURE — 80053 COMPREHEN METABOLIC PANEL: CPT | Performed by: FAMILY MEDICINE

## 2024-12-11 PROCEDURE — 99385 PREV VISIT NEW AGE 18-39: CPT | Performed by: FAMILY MEDICINE

## 2024-12-11 PROCEDURE — 36415 COLL VENOUS BLD VENIPUNCTURE: CPT | Performed by: FAMILY MEDICINE

## 2024-12-11 PROCEDURE — 80061 LIPID PANEL: CPT | Performed by: FAMILY MEDICINE

## 2024-12-11 PROCEDURE — 85027 COMPLETE CBC AUTOMATED: CPT | Performed by: FAMILY MEDICINE

## 2024-12-11 SDOH — HEALTH STABILITY: PHYSICAL HEALTH: ON AVERAGE, HOW MANY MINUTES DO YOU ENGAGE IN EXERCISE AT THIS LEVEL?: 20 MIN

## 2024-12-11 SDOH — HEALTH STABILITY: PHYSICAL HEALTH: ON AVERAGE, HOW MANY DAYS PER WEEK DO YOU ENGAGE IN MODERATE TO STRENUOUS EXERCISE (LIKE A BRISK WALK)?: 2 DAYS

## 2024-12-11 ASSESSMENT — SOCIAL DETERMINANTS OF HEALTH (SDOH): HOW OFTEN DO YOU GET TOGETHER WITH FRIENDS OR RELATIVES?: THREE TIMES A WEEK

## 2024-12-11 NOTE — PROGRESS NOTES
"Preventive Care Visit  [facility] RUSLAN Robles MD, Family Medicine  Dec 11, 2024      Assessment & Plan     Cervical cancer screening  Patient states she is UTD - will get records    Routine general medical examination at a health care facility  She will know if there is anything I can do to support her during this difficult time.  She is working with a therapist and potentially with a small support group.  We also discussed weight management medications today.  She is switching insurance in January and will let me know if she is interested in trialing Zepbound or she may be willing to get Wegovy compounded through [facility] if insurance will not cover.  - Hemoglobin A1c; Future  - COMPREHENSIVE METABOLIC PANEL; Future  - CBC with Platelets; Future  - TSH with free T4 reflex; Future  - Lipid panel reflex to direct LDL Non-fasting; Future  - Hemoglobin A1c  - COMPREHENSIVE METABOLIC PANEL  - CBC with Platelets  - TSH with free T4 reflex  - Lipid panel reflex to direct LDL Non-fasting    Patient has been advised of split billing requirements and indicates understanding: Yes        BMI  Estimated body mass index is 40.49 kg/m  as calculated from the following:    Height as of this encounter: 1.61 m (5' 3.39\").    Weight as of this encounter: 105 kg (231 lb 6 oz).   Weight management plan: Discussed healthy diet and exercise guidelines and discussed medication specifically GLP-1 agonist    Counseling  Appropriate preventive services were addressed with this patient via screening, questionnaire, or discussion as appropriate for fall prevention, nutrition, physical activity, Tobacco-use cessation, social engagement, weight loss and cognition.  Checklist reviewing preventive services available has been given to the patient.  Reviewed patient's diet, addressing concerns and/or questions.   She is at risk for lack of exercise and has been provided with information to increase physical activity " for the benefit of her well-being.   She is at risk for psychosocial distress and has been provided with information to reduce risk.           Beau Grissom is a 34 year old, presenting for the following:  Physical (Declined pap smear- MISSAEL signed for OB/GYN)       Via the Health Maintenance questionnaire, the patient has reported the following services have been completed -Cervical Cancer Screening: [facility] 2023-12-17, this information has been sent to the abstraction team.    HPI  Her fiancé unfortunately unexpectedly passed away from a car accident a few months ago.  She has a 6-month-old son.  She is living with her parents.  She feels as though she is overall doing okay.  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  This is going well for her for the most part.  She is seeing a therapist.  She is also planning on meeting with a support group and thinks that this will be helpful as well.    No specific questions or concerns.  She is concerned about difficulty with losing weight.  She states that she has had difficulty in the past.  She was actually going to see the weight loss clinic but then found out she was pregnant.          Health Care Directive  Patient does not have a Health Care Directive: Discussed advance care planning with patient; however, patient declined at this time.      12/11/2024   General Health   How would you rate your overall physical health? (!) FAIR   Feel stress (tense, anxious, or unable to sleep) To some extent      (!) STRESS CONCERN      12/11/2024   Nutrition   Three or more servings of calcium each day? Yes   Diet: Regular (no restrictions)   How many servings of fruit and vegetables per day? (!) 2-3   How many sweetened beverages each day? 0-1            12/11/2024   Exercise   Days per week of moderate/strenous exercise 2 days   Average minutes spent exercising at this level 20 min      (!) EXERCISE CONCERN      12/11/2024   Social Factors   Frequency of gathering with  friends or relatives Three times a week   Worry food won't last until get money to buy more No   Food not last or not have enough money for food? No   Do you have housing? (Housing is defined as stable permanent housing and does not include staying ouside in a car, in a tent, in an abandoned building, in an overnight shelter, or couch-surfing.) Yes   Are you worried about losing your housing? No   Lack of transportation? No   Unable to get utilities (heat,electricity)? No            12/11/2024   Dental   Dentist two times every year? Yes               Today's PHQ-2 Score:       12/11/2024     2:59 PM   PHQ-2 ( 1999 Pf[employer])   Q1: Little interest or pleasure in doing things Not at all   Q2: Feeling down, depressed or hopeless Several days   PHQ-2 Score 1           12/11/2024   Substance Use   Alcohol more than 3/day or more than 7/wk No   Do you use any other substances recreationally? No        Social History     Tobacco Use    Smoking status: Never     Passive exposure: Never    Smokeless tobacco: Never    Tobacco comments:     no smokers in the home   Vaping Use    Vaping status: Never Used   Substance Use Topics    Alcohol use: No     Comment: Alcoholic Drinks/day: rare    Drug use: No          Mammogram Screening - Patient under 40 years of age: Routine Mammogram Screening not recommended.         12/11/2024   STI Screening   New sexual partner(s) since last STI/HIV test? No        History of abnormal Pap smear: No - age 30-64 HPV with reflex Pap every 5 years recommended             12/11/2024   Contraception/Family Planning   Questions about contraception or family planning No           Reviewed and updated as needed this visit by Provider                    Past Medical History:   Diagnosis Date    Asthma     Mild, intermittent asthma.  Albuterol PRN.  Trigger is viral or other illness.  Some cold trigger too    Female infertility     Attempted pregnancy X2 years.  History of very irregular cycles through  "whole lifetime (no dx PCOS or other).  Recently ~5 periods/year (will skip up to 5-6 months).  Got pregnant on own in 2023!    Irregular menstrual cycle     History of very irregular cycles through whole lifetime (no dx PCOS or other dx). In recent years (9213-2914) having ~5 periods/year (will skip up to 5-6 months, and other times will happen monthly)    Migraine     First started happening in early 20s.  Irregular in nature (may get a grouping of a few in a week, and then won't have any for months).  Gets light sensitivity.  Uses OTC excedrine/water/rest in dark room    Pre-diabetes     Diagnosed with this in the past     Past Surgical History:   Procedure Laterality Date     SECTION N/A 2024    Procedure:  SECTION;  Surgeon: Amy Cullen MD;  Location: [facility] OR    wisdom teeth Bilateral     age 19         Review of Systems  Constitutional, HEENT, cardiovascular, pulmonary, GI, , musculoskeletal, neuro, skin, endocrine and psych systems are negative, except as otherwise noted.     Objective    Exam  /82   Pulse 92   Temp 98  F (36.7  C) (Tympanic)   Resp 16   Ht 1.61 m (5' 3.39\")   Wt 105 kg (231 lb 6 oz)   LMP 2024 (Exact Date)   SpO2 98%   Breastfeeding No   BMI 40.49 kg/m     Estimated body mass index is 40.49 kg/m  as calculated from the following:    Height as of this encounter: 1.61 m (5' 3.39\").    Weight as of this encounter: 105 kg (231 lb 6 oz).    Physical Exam    Physical Exam:  General Appearance: Alert, cooperative, no distress, appears stated age   Head: Normocephalic, without obvious abnormality, atraumatic  Eyes: PERRL, conjunctiva/corneas clear, EOM's intact   Ears: Normal TM's and external ear canals, both ears  Nose:Nares normal, septum midline,mucosa normal, no drainage    Throat:Lips, mucosa, and tongue normal; teeth and gums normal  Neck: Supple, symmetrical, trachea midline, no adenopathy;  thyroid: not enlarged, symmetric, no " tenderness/mass/nodules  Back: Symmetric, no curvature, ROM normal,  Lungs: Clear to auscultation bilaterally, respirations unlabored  Breasts: Did not do to today's patient was holding her 6-month-old son.  Heart: Regular rate and rhythm, S1 and S2 normal, no murmur, rub, or gallop  Abdomen: Soft, non-tender, bowel sounds active all four quadrants,  no masses, no organomegaly  Extremities: Extremities normal, atraumatic, no cyanosis or edema  Skin: Skin color, texture, turgor normal, no rashes or lesions  Lymph nodes: Cervical, supraclavicular, and axillary nodes normal and   Neurologic: Normal          Signed Electronically by: Su Robles MD

## 2024-12-11 NOTE — PATIENT INSTRUCTIONS
Patient Education   Preventive Care Advice   This is general advice given by our system to help you stay healthy. However, your care team may have specific advice just for you. Please talk to your care team about your preventive care needs.  Nutrition  Eat 5 or more servings of fruits and vegetables each day.  Try wheat bread, brown rice and whole grain pasta (instead of white bread, rice, and pasta).  Get enough calcium and vitamin D. Check the label on foods and aim for 100% of the RDA (recommended daily allowance).  Lifestyle  Exercise at least 150 minutes each week  (30 minutes a day, 5 days a week).  Do muscle strengthening activities 2 days a week. These help control your weight and prevent disease.  No smoking.  Wear sunscreen to prevent skin cancer.  Have a dental exam and cleaning every 6 months.  Yearly exams  See your health care team every year to talk about:  Any changes in your health.  Any medicines your care team has prescribed.  Preventive care, family planning, and ways to prevent chronic diseases.  Shots (vaccines)   HPV shots (up to age 26), if you've never had them before.  Hepatitis B shots (up to age 59), if you've never had them before.  COVID-19 shot: Get this shot when it's due.  Flu shot: Get a flu shot every year.  Tetanus shot: Get a tetanus shot every 10 years.  Pneumococcal, hepatitis A, and RSV shots: Ask your care team if you need these based on your risk.  Shingles shot (for age 50 and up)  General health tests  Diabetes screening:  Starting at age 35, Get screened for diabetes at least every 3 years.  If you are younger than age 35, ask your care team if you should be screened for diabetes.  Cholesterol test: At age 39, start having a cholesterol test every 5 years, or more often if advised.  Bone density scan (DEXA): At age 50, ask your care team if you should have this scan for osteoporosis (brittle bones).  Hepatitis C: Get tested at least once in your life.  STIs (sexually  transmitted infections)  Before age 24: Ask your care team if you should be screened for STIs.  After age 24: Get screened for STIs if you're at risk. You are at risk for STIs (including HIV) if:  You are sexually active with more than one person.  You don't use condoms every time.  You or a partner was diagnosed with a sexually transmitted infection.  If you are at risk for HIV, ask about PrEP medicine to prevent HIV.  Get tested for HIV at least once in your life, whether you are at risk for HIV or not.  Cancer screening tests  Cervical cancer screening: If you have a cervix, begin getting regular cervical cancer screening tests starting at age 21.  Breast cancer scan (mammogram): If you've ever had breasts, begin having regular mammograms starting at age 40. This is a scan to check for breast cancer.  Colon cancer screening: It is important to start screening for colon cancer at age 45.  Have a colonoscopy test every 10 years (or more often if you're at risk) Or, ask your provider about stool tests like a FIT test every year or Cologuard test every 3 years.  To learn more about your testing options, visit:   .  For help making a decision, visit:   https://bit.ly/qa47369.  Prostate cancer screening test: If you have a prostate, ask your care team if a prostate cancer screening test (PSA) at age 55 is right for you.  Lung cancer screening: If you are a current or former smoker ages 50 to 80, ask your care team if ongoing lung cancer screenings are right for you.  For informational purposes only. Not to replace the advice of your health care provider. Copyright   2023 [facility]. All rights reserved. Clinically reviewed by the [facility] Transitions Program. [employer] 715888 - REV 01/24.  Learning About Stress  What is stress?     Stress is your body's response to a hard situation. Your body can have a physical, emotional, or mental response. Stress is a fact of life for most people, and it  affects everyone differently. What causes stress for you may not be stressful for someone else.  A lot of things can cause stress. You may feel stress when you go on a job interview, take a test, or run a race. This kind of short-term stress is normal and even useful. It can help you if you need to work hard or react quickly. For example, stress can help you finish an important job on time.  Long-term stress is caused by ongoing stressful situations or events. Examples of long-term stress include long-term health problems, ongoing problems at work, or conflicts in your family. Long-term stress can harm your health.  How does stress affect your health?  When you are stressed, your body responds as though you are in danger. It makes hormones that speed up your heart, make you breathe faster, and give you a burst of energy. This is called the fight-or-flight stress response. If the stress is over quickly, your body goes back to normal and no harm is done.  But if stress happens too often or lasts too long, it can have bad effects. Long-term stress can make you more likely to get sick, and it can make symptoms of some diseases worse. If you tense up when you are stressed, you may develop neck, shoulder, or low back pain. Stress is linked to high blood pressure and heart disease.  Stress also harms your emotional health. It can make you [place], tense, or depressed. Your relationships may suffer, and you may not do well at work or school.  What can you do to manage stress?  You can try these things to help manage stress:   Do something active. Exercise or activity can help reduce stress. Walking is a great way to get started. Even everyday activities such as housecleaning or yard work can help.  Try yoga or cliff chi. These techniques combine exercise and meditation. You may need some training at first to learn them.  Do something you enjoy. For example, listen to music or go to a movie. Practice your hobby or do volunteer  "work.  Meditate. This can help you relax, because you are not worrying about what happened before or what may happen in the future.  Do guided imagery. Imagine yourself in any setting that helps you feel calm. You can use online videos, books, or a teacher to guide you.  Do breathing exercises. For example:  From a standing position, bend forward from the waist with your knees slightly bent. Let your arms dangle close to the floor.  Breathe in slowly and deeply as you return to a standing position. Roll up slowly and lift your head last.  Hold your breath for just a few seconds in the standing position.  Breathe out slowly and bend forward from the waist.  Let your feelings out. Talk, laugh, cry, and express anger when you need to. Talking with supportive friends or family, a counselor, or a [employer] leader about your feelings is a healthy way to relieve stress. Avoid discussing your feelings with people who make you feel worse.  Write. It may help to write about things that are bothering you. This helps you find out how much stress you feel and what is causing it. When you know this, you can find better ways to cope.  What can you do to prevent stress?  You might try some of these things to help prevent stress:  Manage your time. This helps you find time to do the things you want and need to do.  Get enough sleep. Your body recovers from the stresses of the day while you are sleeping.  Get support. Your family, friends, and community can make a difference in how you experience stress.  Limit your news feed. Avoid or limit time on social media or news that may make you feel stressed.  Do something active. Exercise or activity can help reduce stress. Walking is a great way to get started.  Where can you learn more?  Go to https://www.[employer]ruction [employer].net/patiented  Enter N032 in the search box to learn more about \"Learning About Stress.\"  Current as of: October 24, 2023  Content Version: 14.2 2024 [employer]. "   Care instructions adapted under license by your healthcare professional. If you have questions about a medical condition or this instruction, always ask your healthcare professional. [employer] disclaims any warranty or liability for your use of this information.

## 2025-01-22 ENCOUNTER — VIRTUAL VISIT (OUTPATIENT)
Dept: FAMILY MEDICINE | Facility: CLINIC | Age: 35
End: 2025-01-22
Payer: COMMERCIAL

## 2025-01-22 DIAGNOSIS — E66.01 MORBID OBESITY (H): Primary | ICD-10-CM

## 2025-01-22 PROCEDURE — 98006 SYNCH AUDIO-VIDEO EST MOD 30: CPT | Performed by: FAMILY MEDICINE

## 2025-01-22 NOTE — PROGRESS NOTES
"Praveena is a 34 year old who is being evaluated via a billable video visit.    How would you like to obtain your AVS? MyChart  If the video visit is dropped, the invitation should be resent by: Text to cell phone: [phone]  Will anyone else be joining your video visit? No  {If patient encounters technical issues they should call [phone] :644423}    {PROVIDER CHARTING PREFERENCE:537358}    231lbs  BMI - 40.49    Subjective   Praveena is a 34 year old, presenting for the following health issues:  Recheck Medication (Med check- weight management [employer])    HPI     {SUPERLIST (Optional):245598}  {additonal problems for provider to add (Optional):258579}    {ROS Picklists (Optional):673110}      Objective           Vitals:  No vitals were obtained today due to virtual visit.    Physical Exam   {video visit exam brief selected:504157}    {Diagnostic Test Results (Optional):272877}      Video-Visit Details    Type of service:  Video Visit   Originating Location (pt. Location): {video visit patient location:230622::\"Home\"}  {PROVIDER LOCATION On-site should be selected for visits conducted from your clinic location or adjoining [facility] hospital, academic office, or other nearby [facility] building. Off-site should be selected for all other provider locations, including home:916379}  Distant Location (provider location):  {virtual location provider:656341}  Platform used for Video Visit: Doxmadeline  Signed Electronically by: Su Robles MD  {Email feedback regarding this note to [email or web address]   :782160}  " that her weight has stayed fairly stable through December.  On January 1 patient started a new weight loss regimen as detailed below.    Patient joined [employer] -this has been helping her calorie count.  She is eating between 1600 and 1800 tracy daily.  She is increasing her protein and fiber and eating mostly a Mediterranean diet.    She is visiting [employer] at least 3 days/week.  While she is there she is using the treadmill for 30 minutes and working to increase intensity.  She is also using the weight machines.  On days where she is unable to get to the gym she has increased her movement with at home yoga and CASSI classes on YouTube.    Again, these changes started on January 1 with a starting weight of 231 pounds.  Per her insurance, she needs to keep up these changes for 6 months and lose less than 1 pound per week during that time.  If she continues with these changes and continues to struggle with weight loss a GLP-1 would be covered per her insurance provider.            Review of Systems  Constitutional, HEENT, cardiovascular, pulmonary, GI, , musculoskeletal, neuro, skin, endocrine and psych systems are negative, except as otherwise noted.      Objective           Vitals:  Patient reported weight: 231 pounds      Physical Exam   GENERAL: alert and no distress  EYES: Eyes grossly normal to inspection.  No discharge or erythema, or obvious scleral/conjunctival abnormalities.  RESP: No audible wheeze, cough, or visible cyanosis.    SKIN: Visible skin clear. No significant rash, abnormal pigmentation or lesions.  NEURO: Cranial nerves grossly intact.  Mentation and speech appropriate for age.  PSYCH: Appropriate affect, tone, and pace of words          Video-Visit Details    Type of service:  Video Visit   Originating Location (pt. Location): Home    Distant Location (provider location):  On-site  Platform used for Video Visit: Romaine  Signed Electronically by: Su Robles MD

## 2025-03-18 ENCOUNTER — ALLIED HEALTH/NURSE VISIT (OUTPATIENT)
Dept: FAMILY MEDICINE | Facility: CLINIC | Age: 35
End: 2025-03-18
Payer: COMMERCIAL

## 2025-03-18 VITALS — WEIGHT: 235.8 LBS | BODY MASS INDEX: 41.26 KG/M2

## 2025-03-18 PROCEDURE — 99207 PR NO CHARGE LOS: CPT | Performed by: FAMILY MEDICINE

## 2025-03-18 NOTE — PROGRESS NOTES
Presenting for weight check    Has been working out consistently for 5 times weekly.    Starting weight of 231 pounds  Current weight 235.8 pounds    Dr Robles

## 2025-03-19 ASSESSMENT — SLEEP AND FATIGUE QUESTIONNAIRES
HOW LIKELY ARE YOU TO NOD OFF OR FALL ASLEEP IN A CAR, WHILE STOPPED FOR A FEW MINUTES IN TRAFFIC: WOULD NEVER DOZE
HOW LIKELY ARE YOU TO NOD OFF OR FALL ASLEEP WHILE SITTING INACTIVE IN A PUBLIC PLACE: WOULD NEVER DOZE
HOW LIKELY ARE YOU TO NOD OFF OR FALL ASLEEP WHILE SITTING QUIETLY AFTER LUNCH WITHOUT ALCOHOL: SLIGHT CHANCE OF DOZING
HOW LIKELY ARE YOU TO NOD OFF OR FALL ASLEEP WHILE SITTING AND TALKING TO SOMEONE: WOULD NEVER DOZE
HOW LIKELY ARE YOU TO NOD OFF OR FALL ASLEEP WHILE WATCHING TV: SLIGHT CHANCE OF DOZING
HOW LIKELY ARE YOU TO NOD OFF OR FALL ASLEEP WHEN YOU ARE A PASSENGER IN A CAR FOR AN HOUR WITHOUT A BREAK: MODERATE CHANCE OF DOZING
HOW LIKELY ARE YOU TO NOD OFF OR FALL ASLEEP WHILE SITTING AND READING: SLIGHT CHANCE OF DOZING
HOW LIKELY ARE YOU TO NOD OFF OR FALL ASLEEP WHILE LYING DOWN TO REST IN THE AFTERNOON WHEN CIRCUMSTANCES PERMIT: SLIGHT CHANCE OF DOZING

## 2025-04-16 NOTE — PATIENT INSTRUCTIONS
"Aurelio Grissom!        It was great meeting with you today! Here are some links to the handouts I referenced:        Protein Sources:  [email or web address]     Fiber Sources:  [email or web address]     My Plate:  https://www.choosemyplate.gov/        A helpful search term to type into [employer], [employer], etc is \"myplate examples.\" [myplate examples - Google Search]     Key points from today:  Eat 3 meals/day at consistent intervals  Shoot for 65-80g protein (20-30g/meal)  Aim for 25-35g fiber (5-10g/meal)  Eat slowly: 20-30 minutes per meal     Here is a summary of the goals that we discussed:     1. Eat 3 meals/day  - Have your first meal within ~1h, then eat every 4-6 hours      2. For PCOS  - Eat 3 meals/day (as above)  - Include plenty of protein and fiber at each meal  - Limit starches to 25% of the meal  - Emphasize strength training            Let's plan on following up in 1-2 months. This can be scheduled via our call center at . Of course, reach out sooner if you have any questions or concerns. Have a great week and welcome to the program!        Arlin Martin RD, LD, [facility]  Clinical Dietitian   "

## 2025-04-16 NOTE — PROGRESS NOTES
"MEDICAL WEIGHT LOSS INITIAL EVALUATION  Patient accompanied by: self  DIAGNOSIS:  Obesity Class III    NUTRITION HISTORY:  Diet and exercise history per pre-visit questionnaire as follows:        Please name the provider who referred you to Medical Weight Management.  If you do not know, please answer: \"I Don't Know\". Dr. Robles   How concerned are you about your weight? Very Concerned   Would you describe your weight gain as gradual? Yes   I became overweight: As a Teenager   The following factors have contributed to my weight gain: Change in Schedule    Lack of Exercise    Genetic (Runs in the Family)    Stress   I have tried the following methods to lose weight: Watching Portions or Calories    Exercise    Weight Watchers    Fasting   My lowest weight since age 20 was: 160   My highest weight since age 20 was: 248   The most weight I have ever lost was: (lbs) 25   How has your weight changed over the last year? Gained   How many pounds?    I have the following health issues associated with obesity: Pre-Diabetes    Polycystic Ovarian Syndrome    Infertility    Asthma   I have the following symptoms associated with obesity: Infertility (Difficulty Getting Pregnant)    Back Pain    Fatigue    Irregular Menstral Cycle   Please answer the following questions related to your weight loss goals.    I am interested in having a healthier weight to diminish current health problems: Yes   I am interested in having a healthier weight in order to prevent future health problems: Yes   I am interested in having a healthier weight in order to have a future surgery: No   I have the following family history of obesity/being overweight: I am the only one in my immediate family who is overweight    Many of my relatives are overweight   Has anyone in your family had weight loss surgery? No   My employment status is: Part-Time   My job is: Nurse   How much of your job is spent on the computer or phone? Less Than 50%   How many hours do " you spend commuting to work daily? 1   What is your marital status?    Do you have children?    Who do you live with?    Are they supportive of your health goals?    Who does the food shopping?    Have you ever been physically or sexually abused? No   How often in the past 2 weeks have you felt little interest or pleasure in doing things? Not at all   Over the past 2 weeks how often have you felt down, depressed, or hopeless? For Several Days   Based on your typical week, how often do you do the following?    Generally, my meals include foods like these: bread, pasta, rice, potatoes, corn, crackers, sweet dessert, pop, or juice. Almost Everyday   Generally, my meals include foods like these: fried meats, brats, burgers, french fries, pizza, cheese, chips, or ice cream. A Few Times a Week   Eat fast food (like McDonalds, Burger [employer], Taco Bell). Once a Week   Eat at a buffet or sit-down restaurant. Less Than Weekly   Eat most of my meals in front of the TV or computer. A Few Times a Week   Often skip meals, eat at random times, have no regular eating times. A Few Times a Week   Rarely sit down for a meal but snack or graze throughout. A Few Times a Week   Eat extra snacks between meals. A Few Times a Week   Eat most of my food at the end of the day. A Few Times a Week   Eat in the middle of the night or wake up at night to eat. Never   Eat extra snacks to prevent or correct low blood sugar. Never   Eat to prevent acid reflux or stomach pain. Never   Worry about not having enough food to eat. Never   Have you been to the food shelf at least a few times this year? No   Please answer the following questions based on your eating patterns during a typical week.    I eat when I am depressed. Never   I eat when I am stressed. Once a Week   I eat when I am bored. A Few Times a Week   I eat when I am anxious. Never   I eat when I am happy or as a reward. A Few Times a Week   I feel hungry all the time even if I just have  eaten. Never   Feeling full is important to me.    I finish all the food on my plate even if I am already full. Never   I can't resist eating delicious food or walk past the good food/smell. Less Than Weekly   I eat/snack without noticing that I am eating. Never   I eat when I am preparing the meal. Almost Everyday   I eat more than usual when I see others eating. Never   I have trouble not eating sweets, ice cream, cookies, or chips if they are around the house. Almost Everyday   I think about food all day. Everyday   What foods, if any, do you crave? Sweets/Candy/Chocolate   Please list any other foods you crave?    Please answer the following questions regarding the amount of food you have eaten over the past 6 months.    I feel out of control when eating. Monthly   I eat a large amount of food, like a loaf of bread, a box of cookies, a pint/quart of ice cream, all at once. Never   I eat a large amount of food even when I am not hungry. Never   I eat rapidly. Weekly   I eat alone because I feel embarrassed and do not want others to see how much I have eaten. Weekly   I eat until I am uncomfortably full. Monthly   I feel bad, disgusted, or guilty after I overeat. Weekly   I make myself vomit what I have eaten or use laxatives to get rid of food. Never   Please answer the following questions regarding if you usually eat a meal or not.  Please list all foods where appropriate. There are no wrong or right foods.      Do you typically eat breakfast? Yes   If you do eat breakfast, what types of food do you eat? Eggs, coffee, fruit, toast, avocado   Do you typically eat lunch? No   Do you typically eat supper? Yes   If you do eat supper, what types of food do you typically eat? Random   Do you typically eat snacks? Yes   If you do snack, what types of food do you typically eat? Sardines, cheesesticks, fruit, pastry, chips   Do you like vegetables? Yes   Do you drink water? Yes   How many glasses of juice do you drink in  a typical day? 0   How many of glasses of milk do you drink in a typical day? 0   How many 8oz glasses of sugar containing drinks such as Evan-Aid/sweet tea do you drink in a day? 1   How many cans/bottles of sugar pop/soda/tea/sports drinks do you drink in a day? 0   How many cans/bottles of diet pop/soda/tea or sports drink do you drink in a day? 1   How often do you have a drink of alcohol? Never   Please answer these questions based on a typical 12 hour day including time at work and home.    How much of a typical 12 hour day do you spend sitting? Half the Day   How much of a typical 12 hour day do you spend lying down? Less Than Half the Day   How much of a typical day do you spend walking/standing? Less Than Half the Day   How many hours (not including work) do you spend on the TV/Video Games/Computer/Tablet/Phone? 2-3 Hours   How many times a week are you active for the purpose of exercise? 2-3 Times a Week   What keeps you from being more active? Pain    Lack of Time    Too tired    Unsure What To Do   How many total minutes do you spend doing some activity for the purpose of exercising when you exercise? 15-30 Minutes   Please answer the following questions regarding your sleep.    How many hours do you sleep at night? 7   Do you think that you snore loudly or has anybody ever heard you snore loudly (louder than talking or so loud it can be heard behind a shut door)? Yes   Has anyone seen or heard you stop breathing during your sleep? Yes   Do you often feel tired, fatigued, or sleepy during the day? Yes   Do you have a TV/Computer in your bedroom? No   How ready are you to make changes regarding your weight? Number 1 = Not ready at all to make changes up to 10 = very ready. 9   How confident are you that you can change? 1 = Not confident that you will be successful making changes up to 10 = very confident. 8   Is there anything else you would like to tell us about yourself or your weight?   "        ADDITIONAL INFORMATION: Pt is single Mom to 10.5mo son and a . Tends to eat inconsistently secondary to caring for son - will eat breakfast together but then she may go until evenings before having a meal again, or will graze throughout the day. Very hungry in the evenings. Plans to start GLP1 this summer but will start Metformin int he interim. Appropriate questions about PCOS management.       ANTHROPOMETRICS:  Height: 5' 3\"   Weight: 241 lbs 3.2 oz  BMI:  Body mass index is 42.73 kg/m .   NUTRITION DIAGNOSIS:   Obese class III related to overeating and poor lifestyle habits as evidence by patient's subjective statements and  BMI of 42.73 kg/m2   NUTRITION INTERVENTIONS  Nutrition Prescription:  Recommend modified energy- nutrient intake  Implementation:  Nutrition Education (Content):  Discussed portion sizes and plate method  Provided: Tips for Weight Loss and Weight Management, Plate Guidelines, Protein Sources for Weight Loss, Fiber Content in Food    Nutrition Education (Application):   Patient to practice goals as stated below  Patient verbalizes understanding of diet by stating she will eat meals at regular intervlas  Anticipate good compliance    Goals:  Have first meal within 1 hour of waking up  Eat meals every 4-6 hours/avoid skipping meals      FOLLOW UP AND MONITORING:    Other  - follow up in 4-8 weeks.     TIME SPENT WITH PATIENT:   30 minutes     Arlin Martin RD, LD, [facility]  Clinical Dietitian   "

## 2025-04-17 ENCOUNTER — OFFICE VISIT (OUTPATIENT)
Dept: SURGERY | Facility: CLINIC | Age: 35
End: 2025-04-17
Payer: COMMERCIAL

## 2025-04-17 ENCOUNTER — ALLIED HEALTH/NURSE VISIT (OUTPATIENT)
Dept: SURGERY | Facility: CLINIC | Age: 35
End: 2025-04-17
Payer: COMMERCIAL

## 2025-04-17 VITALS — HEIGHT: 63 IN | WEIGHT: 241.2 LBS | BODY MASS INDEX: 42.74 KG/M2

## 2025-04-17 VITALS
DIASTOLIC BLOOD PRESSURE: 70 MMHG | OXYGEN SATURATION: 96 % | BODY MASS INDEX: 42.74 KG/M2 | HEIGHT: 63 IN | SYSTOLIC BLOOD PRESSURE: 130 MMHG | WEIGHT: 241.2 LBS | HEART RATE: 90 BPM

## 2025-04-17 DIAGNOSIS — E66.813 CLASS 3 OBESITY: Primary | ICD-10-CM

## 2025-04-17 DIAGNOSIS — R73.03 PRE-DIABETES: ICD-10-CM

## 2025-04-17 RX ORDER — ONDANSETRON 4 MG/1
4 TABLET, ORALLY DISINTEGRATING ORAL EVERY 6 HOURS PRN
Qty: 30 TABLET | Refills: 0 | Status: SHIPPED | OUTPATIENT
Start: 2025-04-17

## 2025-04-17 RX ORDER — METFORMIN HYDROCHLORIDE 500 MG/1
TABLET, EXTENDED RELEASE ORAL
Qty: 270 TABLET | Refills: 1 | Status: SHIPPED | OUTPATIENT
Start: 2025-04-17 | End: 2025-11-04

## 2025-04-17 NOTE — PATIENT INSTRUCTIONS
Nice to talk with you today. Below is the plan discussed.-  MICAELA Earl      Pt Instructions:  Start Metformin  Directions: Take 1 tablet by mouth daily with a meal for 1 week, then increase to 2 tablets daily with a meal for a week, if tolerating can increase to 3 tablets daily.    In June, will order Zepbound and do Prior Auth.  Please mychart after June 15th.     Labs ordered to be drawn in 3 mo.  Please call [phone] to set up a lab appt.     Goals:  Get outside and walk most days.    Eating 3 meals a day    Follow up:    Call [phone] to schedule next visit in Aug and Nov     METFORMIN    Metformin is a medication that is used to assist with lowering blood sugars in patients that have Pre-Diabetes or Diabetes. It has also been found to help with modest weight loss.    Metformin helps to lower blood sugars by lowering the amount of sugar (glucose) your liver produces that would otherwise cause your blood sugar to increase. It also makes your body respond better to insulin (the product that lowers your blood sugar).     Emerging research reported in journals suggests metformin may also lead to weight loss as a result of changes in the appetite centers of the brain, shifts in the gut microbiome, and reversal of metabolic changes that usually happen with age.    Starting instructions:  Take 1 tablet by mouth daily with a meal for 1 week, then increase to 2 tablets daily with a meal, if tolerating can increase to 3 tablets daily with a meal or at bedtime.     Side-effects. Metformin is generally well tolerated. The main side-effects we see are: Diarrhea, nausea and stomach upset - this tends to subside over time as your body gets used to the medication. Taking this medications with food will lower these side effects.    For any questions or concerns please send a [employer] message to our team or call our weight management call center at [phone].     In order to get refills of this or any medication we  prescribe you must be seen in the medical weight mgmt clinic every 6 months.

## 2025-04-17 NOTE — ASSESSMENT & PLAN NOTE
4/17/2025 [facility] initial Wt 241 lb BMI 42.73  Metformin Start. Cr WNL. Will Order Zepbound in June after 6 mo trial of weight loss program per insurance.  Dietician.  BMP in 3 mo.

## 2025-04-17 NOTE — PROGRESS NOTES
"New Medical Weight Management Consult    PATIENT:  Praveena Hernandez   MRN:         [id number]   :         1990  JOAQUIN:         2025      Dear JAS Paredes CNM,    I had the pleasure of seeing your patient, Praveena Hernandez. Full intake/assessment was done to determine barriers to weight loss success and develop a treatment plan. Praveena Hernandez is a 34 year old female interested in treatment of medical problems associated with excess weight. She has a height of 5' 3\", a weight of 241 lbs 3.2 oz, and the calculated Body mass index is 42.73 kg/m .    Assessment & Plan   Problem List Items Addressed This Visit       Pre-diabetes     HgA1C 6.0, Metformin Start         Relevant Medications    metFORMIN (GLUCOPHAGE XR) 500 MG 24 hr tablet    ondansetron (ZOFRAN ODT) 4 MG ODT tab    Other Relevant Orders    Basic metabolic panel    Class 3 obesity - Primary     2025 [facility] initial Wt 241 lb BMI 42.73  Metformin Start. Cr WNL. Will Order Zepbound in  after 6 mo trial of weight loss program per insurance.  Dietician.  BMP in 3 mo.          Relevant Medications    metFORMIN (GLUCOPHAGE XR) 500 MG 24 hr tablet    ondansetron (ZOFRAN ODT) 4 MG ODT tab    Other Relevant Orders    Basic metabolic panel        PROGRAM OVERVIEW  Reviewed options at [facility].   All questions were answered. Education provided on chronic disease management of obesity.    SURGICAL WEIGHT LOSS   Option presented given pt BMI and current comorbid conditions. Very little knowledge.  Open if needed.     MEDICATIONS:  We discussed healthy habits to assist with weight loss. We reviewed medications associated with weight gain. We discussed the role of pharmacological agents in the treatment of obesity and the \"off-label\" use of medications in this practice. We reviewed medication that may assist with weight loss. Indications, contraindications, risks/benefits, and potential side effects were discussed.   Metformin was " "prescribed. Will ultimately send in Zepbound after 6 months of weight management plan completed. Discussed that medications must always be used together with lifestyle changes. Reviewed rationale for long term use of pharmacotherapy in chronic disease management for obesity.      AOM Considerations:  Phentermine:  Candidate, would monitor BP  Topiramate:  Caution, not on BC  GLP-1:  Covered after 6 mo wt loss program.  Could appeal on after 6/11/2025      Naltrexone:  Candidate  Wellbutrin:  Candidate  Metformin:  Candidate, has Pre-DM  Contrave:   Qsymia:            PATIENT INSTRUCTIONS:  Start Metformin  Directions: Take 1 tablet by mouth daily with a meal for 1 week, then increase to 2 tablets daily with a meal for a week, if tolerating can increase to 3 tablets daily.    In June, will order Zepbound and do Prior Auth.  Please mychart after June 15th.     Labs ordered to be drawn in 3 mo.  Please call [phone] to set up a lab appt.     Goals:  Get outside and walk most days.    Eating 3 meals a day    Follow up:    Call [phone] to schedule next visit in Aug and Nov     55 minutes spent on the date of the encounter doing chart review, history and exam, review test results, counseling, developing plan of care, documentation, and further activities as noted above.      She has the following co-morbidities:        4/16/2025     9:10 PM   --   I have the following health issues associated with obesity Pre-Diabetes    Polycystic Ovarian Syndrome    Infertility    Asthma   I have the following symptoms associated with obesity Infertility (Difficulty Getting Pregnant)    Back Pain    Fatigue    Irregular Menstral Cycle           4/16/2025     9:10 PM   Referring Provider   Please name the provider who referred you to Medical Weight Management  If you do not know, please answer \"I Don't Know\" Dr. Robles           4/16/2025     9:10 PM   Weight History   How concerned are you about your weight? Very Concerned   I " became overweight As a Teenager   The following factors have contributed to my weight gain Change in Schedule    Lack of Exercise    Genetic (Runs in the Family)    Stress   I have tried the following methods to lose weight Watching Portions or Calories    Exercise    Weight Watchers    Fasting   My lowest weight since age 20 was 160   My highest weight since age 20 was 260   The most weight I have ever lost was (lbs) 25   I have the following family history of obesity/being overweight I am the only one in my immediate family who is overweight    Many of my relatives are overweight   How has your weight changed over the last year? Gained   Hoping to lose weight, wants to lower risk of CV disease. Is 10 months post partum.  unfortunately unexpectedly passed away from a car accident a few months ago. She has a 6-month-old son. She is living with her parents. She feels as though she is overall doing okay. 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 This is going well for her for the most part. She is seeing a therapist     6 month Weight Management plan needed per insurance for GLP-1:  Saw PCP in December about weight.  They discussed healthy eating and activity. Zepbound ordered in Jan.  In January pt started Mediterranean diet and trying to walk 3 x a week.  Has had follow up with PCP in March.            4/16/2025     9:10 PM   Diet Recall Review with Patient   If you do eat breakfast, what types of food do you eat? Wake up 5:45  Works 7am to 7pm   11 am 2 boiled eggs or cottage cheese with peaches   If you do eat supper, what types of food do you typically eat? 6 PM  2/5 days a week at work Turkey sandwich  Otherwise after 9 PM    If eating dinner early she would have a snack.     If you do snack, what types of food do you typically eat? Sardines, cheesesticks, fruit, pastry, chips   How many glasses of juice do you drink in a typical day? 0   How many of glasses of milk do you drink in a typical day? 0   How  many 8oz glasses of sugar containing drinks such as Evan-Aid/sweet tea do you drink in a day? 1   How many cans/bottles of sugar pop/soda/tea/sports drinks do you drink in a day? 0   How many cans/bottles of diet pop/soda/tea or sports drink do you drink in a day? 1   How often do you have a drink of alcohol? Never   Only parent.  Eating late if eating at all.          4/16/2025     9:10 PM   Eating Habits   Generally, my meals include foods like these bread, pasta, rice, potatoes, corn, crackers, sweet dessert, pop, or juice Almost Everyday   Generally, my meals include foods like these fried meats, brats, burgers, french fries, pizza, cheese, chips, or ice cream A Few Times a Week   Eat fast food (like McDonalds, [employer], Taco Bell) Once a Week   Eat at a buffet or sit-down restaurant Less Than Weekly   Eat most of my meals in front of the TV or computer A Few Times a Week   Often skip meals, eat at random times, have no regular eating times A Few Times a Week   Rarely sit down for a meal but snack or graze throughout A Few Times a Week   Eat extra snacks between meals A Few Times a Week   Eat most of my food at the end of the day A Few Times a Week   Eat in the middle of the night or wake up at night to eat Never   Eat extra snacks to prevent or correct low blood sugar Never   Eat to prevent acid reflux or stomach pain Never   Worry about not having enough food to eat Never   I eat when I am depressed Never   I eat when I am stressed Once a Week   I eat when I am bored A Few Times a Week   I eat when I am anxious Never   I eat when I am happy or as a reward A Few Times a Week   I feel hungry all the time even if I just have eaten Never   I finish all the food on my plate even if I am already full Never   I can't resist eating delicious food or walk past the good food/smell Less Than Weekly   I eat/snack without noticing that I am eating Never   I eat when I am preparing the meal Almost Everyday   I eat more  than usual when I see others eating Never   I have trouble not eating sweets, ice cream, cookies, or chips if they are around the house Almost Everyday   I think about food all day Everyday   What foods, if any, do you crave? Sweets/Candy/Chocolate   Major food noise.          4/16/2025     9:10 PM   Amount of Food   I feel out of control when eating Monthly   I eat a large amount of food, like a loaf of bread, a box of cookies, a pint/quart of ice cream, all at once Never   I eat a large amount of food even when I am not hungry Never   I eat rapidly Weekly   I eat alone because I feel embarrassed and do not want others to see how much I have eaten Weekly   I eat until I am uncomfortably full Monthly   I feel bad, disgusted, or guilty after I overeat Weekly           4/16/2025     9:10 PM   Activity/Exercise History   How much of a typical 12 hour day do you spend sitting? Half the Day   How much of a typical 12 hour day do you spend lying down? Less Than Half the Day   How much of a typical day do you spend walking/standing? Less Than Half the Day   How many hours (not including work) do you spend on the TV/Video Games/Computer/Tablet/Phone? 2-3 Hours   How many times a week are you active for the purpose of exercise? 2-3 Times a Week   What keeps you from being more active? Pain    Lack of Time    Too tired    Unsure What To Do   How many total minutes do you spend doing some activity for the purpose of exercising when you exercise? 15-30 Minutes   Trying to walking outside, trying to increase step count.  Has [employer] membership but hard to get to gym.      PAST MEDICAL HISTORY:  Past Medical History:   Diagnosis Date    Asthma     Mild, intermittent asthma.  Albuterol PRN.  Trigger is viral or other illness.  Some cold trigger too    Female infertility     Attempted pregnancy X2 years.  History of very irregular cycles through whole lifetime (no dx PCOS or other).  Recently ~5 periods/year (will skip up to  5-6 months).  Got pregnant on own in fall 2023!    Irregular menstrual cycle     History of very irregular cycles through whole lifetime (no dx PCOS or other dx). In recent years ([id number]) having ~5 periods/year (will skip up to 5-6 months, and other times will happen monthly)    Migraine     First started happening in early 20s.  Irregular in nature (may get a grouping of a few in a week, and then won't have any for months).  Gets light sensitivity.  Uses OTC excedrine/water/rest in dark room    Pre-diabetes     Diagnosed with this in the past           4/16/2025     9:10 PM   Work/Social History Reviewed With Patient   My employment status is Part-Time   My job is Nurse   How much of your job is spent on the computer or phone? Less Than 50%   How many hours do you spend commuting to work daily? 1       Social History     Tobacco Use    Smoking status: Never     Passive exposure: Never    Smokeless tobacco: Never    Tobacco comments:     no smokers in the home   Vaping Use    Vaping status: Never Used   Substance Use Topics    Alcohol use: No     Comment: Alcoholic Drinks/day: rare    Drug use: No            4/16/2025     9:10 PM   Mental Health History Reviewed With Patient   Have you ever been physically or sexually abused? No   How often in the past 2 weeks have you felt little interest or pleasure in doing things? Not at all   Over the past 2 weeks how often have you felt down, depressed, or hopeless? For Several Days           4/16/2025     9:10 PM   Questions Reviewed With Patient   How ready are you to make changes regarding your weight? Number 1 = Not ready at all to make changes up to 10 = very ready. 9   How confident are you that you can change? 1 = Not confident that you will be successful making changes up to 10 = very confident. 8           4/16/2025     9:10 PM   Sleep History Reviewed With Patient   How many hours do you sleep at night? 7       MEDICATIONS:   Current Outpatient Medications    Medication Sig Dispense Refill    metFORMIN (GLUCOPHAGE XR) 500 MG 24 hr tablet Take 1 tablet (500 mg) by mouth daily (with dinner) for 7 days, THEN 2 tablets (1,000 mg) daily (with dinner) for 7 days, THEN 3 tablets (1,500 mg) daily (with dinner). 270 tablet 1    ondansetron (ZOFRAN ODT) 4 MG ODT tab Take 1 tablet (4 mg) by mouth every 6 hours as needed for nausea. 30 tablet 0    albuterol (PROAIR HFA/PROVENTIL HFA/VENTOLIN HFA) 108 (90 Base) MCG/ACT inhaler Inhale 1-2 puffs into the lungs      Prenatal Vit-Fe Fumarate-FA (PRENATAL VITAMIN PO) Take 1 tablet by mouth daily         ALLERGIES:   Allergies   Allergen Reactions    Sulfa Antibiotics Other (See Comments) and Shortness Of Breath       ROS:    HEENT  H/O glaucoma:  no  Cardiovascular  CAD:   no  Palpitations:   no  HTN:    no  Gastrointestinal  GERD:   no  Constipation:   no  Liver Dz:   no  H/O Pancreatitis:  no  H/O Gallbladder Dz: no  Psychiatric  Moods Stable:  no  Anxiety:   no  Depression:  no  Bipolar:  no  H/O ETOH/Drug abuse: no  H/O eating disorder: no  Endocrine  PMH/FMH of MTC or MEN2:  no  Neurologic:  H/O seizures:   no  Headaches:  In past  Memory Impairment:  no    H/O kidney stones:  no  Kidney disease:  no  Current birth control:  Not sexually active    LABS/RECORDS REVIEWED:  Hemoglobin A1C   Date Value Ref Range Status   12/11/2024 6.0 (H) 0.0 - 5.6 % Final     Comment:     Normal <5.7%   Prediabetes 5.7-6.4%    Diabetes 6.5% or higher     Note: Adopted from ADA consensus guidelines.     TSH   Date Value Ref Range Status   12/11/2024 1.68 0.30 - 4.20 uIU/mL Final   06/06/2008 1.05 0.4 - 5.0 mU/L Final     Sodium   Date Value Ref Range Status   12/11/2024 138 135 - 145 mmol/L Final     Potassium   Date Value Ref Range Status   12/11/2024 4.2 3.4 - 5.3 mmol/L Final   05/24/2021 4.5 3.5 - 5.0 mmol/L Final   05/24/2021 5.0 3.5 - 5.0 mmol/L Final     Chloride   Date Value Ref Range Status   12/11/2024 102 98 - 107 mmol/L Final    05/24/2021 107 98 - 107 mmol/L Final   05/24/2021 107 98 - 107 mmol/L Final     Carbon Dioxide (CO2)   Date Value Ref Range Status   12/11/2024 23 22 - 29 mmol/L Final   05/24/2021 22 22 - 31 mmol/L Final   05/24/2021 21 (L) 22 - 31 mmol/L Final     Anion Gap   Date Value Ref Range Status   12/11/2024 13 7 - 15 mmol/L Final   05/24/2021 8 5 - 18 mmol/L Final   05/24/2021 11 5 - 18 mmol/L Final     Glucose   Date Value Ref Range Status   12/11/2024 96 70 - 99 mg/dL Final   05/24/2021 105 70 - 125 mg/dL Final   05/24/2021 102 70 - 125 mg/dL Final     Urea Nitrogen   Date Value Ref Range Status   12/11/2024 11.3 6.0 - 20.0 mg/dL Final   05/24/2021 13 8 - 22 mg/dL Final   05/24/2021 13 8 - 22 mg/dL Final     Creatinine   Date Value Ref Range Status   12/11/2024 0.65 0.51 - 0.95 mg/dL Final     GFR Estimate   Date Value Ref Range Status   12/11/2024 >90 >60 mL/min/1.73m2 Final     Comment:     eGFR calculated using 2021 CKD-EPI equation.   05/24/2021 >60 >60 mL/min/1.73m2 Final   05/24/2021 >60 >60 mL/min/1.73m2 Final     Calcium   Date Value Ref Range Status   12/11/2024 9.3 8.8 - 10.4 mg/dL Final     Comment:     Reference intervals for this test were updated on 7/16/2024 to reflect our healthy population more accurately. There may be differences in the flagging of prior results with similar values performed with this method. Those prior results can be interpreted in the context of the updated reference intervals.     ALT   Date Value Ref Range Status   12/11/2024 25 0 - 50 U/L Final     AST   Date Value Ref Range Status   12/11/2024 28 0 - 45 U/L Final     Cholesterol   Date Value Ref Range Status   12/11/2024 213 (H) <200 mg/dL Final     Direct Measure HDL   Date Value Ref Range Status   12/11/2024 52 >=50 mg/dL Final     LDL Cholesterol Calculated   Date Value Ref Range Status   12/11/2024 119 (H) <100 mg/dL Final     Triglycerides   Date Value Ref Range Status   12/11/2024 209 (H) <150 mg/dL Final     Hemoglobin  "  Date Value Ref Range Status   12/11/2024 12.7 11.7 - 15.7 g/dL Final   06/06/2008 12.7 11.7 - 15.7 g/dL Final           BP Readings from Last 6 Encounters:   04/17/25 130/70   12/11/24 116/82   05/27/24 131/80   11/27/23 124/80   11/21/18 120/78   01/20/10 106/74 (39%, Z = -0.28 /  84%, Z = 0.99)*     *BP percentiles are based on the 2017 AAP Clinical Practice Guideline for girls       Pulse Readings from Last 6 Encounters:   04/17/25 90   12/11/24 92   05/27/24 98   11/27/23 92   11/21/18 123   01/20/10 92       PHYSICAL EXAM:  /70   Pulse 90   Ht 5' 3\" (1.6 m)   Wt 241 lb 3.2 oz (109.4 kg)   SpO2 96%   BMI 42.73 kg/m    GENERAL: Healthy, alert and no distress  RESP: No audible wheeze, cough, or visible cyanosis.  No visible retractions or increased work of breathing.    SKIN: Visible skin clear. No significant rash, abnormal pigmentation or lesions.  PSYCH: Mentation appears normal, affect normal/bright, judgement and insight intact, normal speech and appearance well-groomed.    COUNSELING:   Reviewed obesity as a chronic disease and comprehensive management stratagies.      We discussed Bariatric Basics including:  -eating 3 meals daily  -eating protein first  -eating slowly, chewing food well  -avoiding/limiting calorie containing beverages  -limiting carbohydrates and changing to whole grains  -limiting restaurant or cafeteria eating to twice a week or less    We discussed the importance of restorative sleep and stress management in maintaining a healthy weight.  We discussed insulin resistance and glycemic index as it relates to appetite and weight control.   We discussed the importance of physical activity including cardiovascular and strength training in maintaining a healthier weight and explored viable options.  Patient education of above written in AVS.      Sincerely,    Mady Centeno PA-C        "

## 2025-05-22 ENCOUNTER — TRANSFERRED RECORDS (OUTPATIENT)
Dept: HEALTH INFORMATION MANAGEMENT | Facility: CLINIC | Age: 35
End: 2025-05-22
Payer: COMMERCIAL

## 2025-05-27 DIAGNOSIS — R05.1 ACUTE COUGH: Primary | ICD-10-CM

## 2025-05-27 RX ORDER — ALBUTEROL SULFATE 90 UG/1
1-2 INHALANT RESPIRATORY (INHALATION) EVERY 6 HOURS PRN
Qty: 18 G | Refills: 1 | Status: SHIPPED | OUTPATIENT
Start: 2025-05-27

## 2025-05-28 NOTE — PROGRESS NOTES
"Praveena is a 34 year old who is being evaluated via a billable video visit.      The patient has been notified of following:     \"This video visit will be conducted via a call between you and your physician/provider. We have found that certain health care needs can be provided without the need for an in-person physical exam.  This service lets us provide the care you need with a video conversation.  If a prescription is necessary we can send it directly to your pharmacy.  If lab work is needed we can place an order for that and you can then stop by our lab to have the test done at a later time.    Video visits are billed at different rates depending on your insurance coverage.  Please reach out to your insurance provider with any questions.    If during the course of the call the physician/provider feels a video visit is not appropriate, you will not be charged for this service.\"    Patient has given verbal consent for Video visit? Yes    How would you like to obtain your AVS? MyChart    If the video visit is dropped, the invitation should be resent by: Text to cell phone: [phone]    Will anyone else be joining your video visit? No    I    Video-Visit Details    Type of service:  Video Visit    Originating Location (pt. Location): Home    Distant Location (provider location):   [facility]    Platform used for Video Visit: [employer]    Video Start Time: 1:34    Video End Time: 1:56     MEDICAL WEIGHT LOSS FOLLOW UP  Patient accompanied by: self      DIAGNOSIS:  Class III Obesity    NUTRITION HISTORY:    Breakfast: sour dough toast, egg, avocado @ within 1 hour of waking     Lunch:  tuna salad on sour dough bread, steamed carrots @1-2 PM     Dinner:  bowl of high protein granola with berries @ 8 PM     Snacks:  2X per day-bell peppers, cucumber or water melon     Beverage Choices:  60-80 oz water, 2X per week 1 can diet coke, black or herbal tea, coffee with cream/ sometimes " "with honey, rare ETOH (1 drink every 2 months)     Eating Behaviors: 1X every 2 weeks will put baby down and grazing- sandwich, chips, fruit bar, pistachios     Dining Out: rare     EXERCISE:  Type: walking with baby  Frequency: 3 days per week  Duration: 1.5 miles    ADDITIONAL INFORMATION:  Doing better with eating meals a regular intervals. Has ~11 month old son. 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 Eats less at night.       ANTHROPOMETRICS:    Initial Weight: 241.2 pounds    Height: 63\"    Current Weight:  230 pounds patient reported    Weight Change:  11.2 pounds decrease     BMI: 40.74 kg/m2    MEDICATION FOR WEIGHT LOSS:  Metformin    EVALUATION/PROGRESS TOWARDS GOALS:  Previous Goals:  Have first meal within 1 hour of waking up-met  Eat meals every 4-6 hours/avoid skipping meals-improving      Previous Nutrition Diagnosis:  Obese class III related to food and nutrition knowledge deficit as evidence by patient's subjective statements and  BMI of 42.73 kg/m2     Current Nutrition Diagnosis:   Obese class III related to food and nutrition knowledge deficit as evidence by patient's subjective statements and  BMI of 40.74 kg/m2  No change      INTERVENTION:    Nutrition Prescription:  Recommend modified nutrient intake by decreasing energy intake    Implementation:    Meals and Snacks:3/2    Nutrition Education (Content):  Discussed previous goals and determined new goals  Encourage physical activity  Supported patient in attempted weight loss and behavior changes   Congratulated patient on successful weight loss   Patient verbalizes understanding of weight management by asking how much protein to eat in a day  Anticipate good compliance    Goals:  Add strength training [id number] per week   Aim to get in 65-80 g protein (1-1.2 g/kg adjusted BW)    Follow Up/Monitoring:  Other  -  patient to follow up in 8 weeks    Time Spent With Patient:  22 Minutes  Nikko York RD, LD  [facility], [place] "

## 2025-05-28 NOTE — PATIENT INSTRUCTIONS
Aurelio Grissom-  Welcome to the [facility], [place]! It was great to visit with you and learn about your progress! Below are the goals we discussed.  Goals:  Add strength training [id number] per week   Aim to get in 65-80 g protein (1-1.2 g/kg adjusted BW)    Nutrition Educational Materials:  Protein Sources for Weight Loss       Please call [phone] to schedule your next visit with a Dietitian in 2 months.    Please reach out to your care team through [employer] with any questions or concerns.    Thanks!  Nikko York RD, LD  [facility], [place]

## 2025-06-04 ENCOUNTER — VIRTUAL VISIT (OUTPATIENT)
Dept: SURGERY | Facility: CLINIC | Age: 35
End: 2025-06-04
Payer: COMMERCIAL

## 2025-06-04 VITALS — BODY MASS INDEX: 40.74 KG/M2 | WEIGHT: 230 LBS

## 2025-06-04 DIAGNOSIS — E66.813 CLASS 3 OBESITY (H): Primary | ICD-10-CM

## 2025-06-04 PROCEDURE — 97803 MED NUTRITION INDIV SUBSEQ: CPT | Mod: 95 | Performed by: DIETITIAN, REGISTERED

## 2025-06-12 ENCOUNTER — TELEPHONE (OUTPATIENT)
Dept: SURGERY | Facility: CLINIC | Age: 35
End: 2025-06-12
Payer: COMMERCIAL

## 2025-06-12 NOTE — TELEPHONE ENCOUNTER
Prior Authorization Approval    Medication: WEGOVY 0.25 MG/0.5ML SC SOAJ  Authorization Effective Date: 4/1/2025  Authorization Expiration Date: 12/12/2025  Approved Dose/Quantity: UP TO 8 PENS  DAYS  Reference #:     Insurance Company: [employer] [place] - Phone [phone] Fax [phone]  Expected CoPay: $    CoPay Card Available:      Financial Assistance Needed: NA  Which Pharmacy is filling the prescription:  [place]  Pharmacy Notified: YES  Patient Notified: Pharmacy will call pt when ready

## 2025-06-12 NOTE — TELEPHONE ENCOUNTER
PA request for Medication:  Wegovy 0.5mg/0.5ml Soaj    Insurance name:  [employer]  Insurance phone number:  [phone]  Insurance ID:  [id number]    Pharmacy requesting the PA:  [facility]    Thank you!

## 2025-08-05 ENCOUNTER — VIRTUAL VISIT (OUTPATIENT)
Dept: SURGERY | Facility: CLINIC | Age: 35
End: 2025-08-05
Payer: COMMERCIAL

## 2025-08-05 VITALS — WEIGHT: 225.8 LBS | BODY MASS INDEX: 40.01 KG/M2 | HEIGHT: 63 IN

## 2025-08-05 DIAGNOSIS — R73.03 PRE-DIABETES: ICD-10-CM

## 2025-08-05 DIAGNOSIS — E66.813 CLASS 3 OBESITY (H): ICD-10-CM

## 2025-08-05 PROCEDURE — 98006 SYNCH AUDIO-VIDEO EST MOD 30: CPT | Performed by: PHYSICIAN ASSISTANT

## (undated) DEVICE — SUCTION MANIFOLD NEPTUNE 2 SYS 1 PORT 702-025-000

## (undated) DEVICE — SOL WATER IRRIG 1000ML BOTTLE 2F7114

## (undated) DEVICE — SU MONOCRYL+ 4-0 18IN PS2 UND MCP496G

## (undated) DEVICE — SU DERMABOND ADVANCED .7ML DNX12

## (undated) DEVICE — SU VICRYL+ 3-0 27IN SH UND VCP416H

## (undated) DEVICE — PAD BLADDER CNTRL SM BL 4IN X 9.75IN  1100B

## (undated) DEVICE — PREP CHLORAPREP 26ML TINTED HI-LITE ORANGE 930815

## (undated) DEVICE — DRAPE STERI CESAREAN W/POUCH 7966

## (undated) DEVICE — SU VICRYL+ 2-0 XLH 27IN VIO VCP581G

## (undated) DEVICE — DRSG STERI STRIP 1/2X4" R1547

## (undated) DEVICE — SUTURE VICRYL+ 3-0 27IN CT-1 UND VCP258H

## (undated) DEVICE — Device

## (undated) DEVICE — CUSTOM PACK C-SECTION LHE

## (undated) DEVICE — SOL NACL 0.9% IRRIG 1000ML BOTTLE 2F7124

## (undated) DEVICE — PACK MINOR SINGLE BASIN SSK3001

## (undated) DEVICE — GOWN LG DISP 9515

## (undated) DEVICE — BLADE CLIPPER DISP 4406

## (undated) DEVICE — SUTURE VICRYL+ 0 36IN CTX VIOLET VCP978H

## (undated) DEVICE — DRESSING MEPILEX ADH 4INX10IN STRL LF 496455

## (undated) DEVICE — GLOVE UNDER INDICATOR PI SZ 6.5 LF 41665

## (undated) DEVICE — ESU GROUND PAD ADULT REM W/15' CORD E7507DB

## (undated) RX ORDER — OXYTOCIN 10 [USP'U]/ML
INJECTION, SOLUTION INTRAMUSCULAR; INTRAVENOUS
Status: DISPENSED
Start: 2024-05-25

## (undated) RX ORDER — MORPHINE SULFATE 1 MG/ML
INJECTION, SOLUTION EPIDURAL; INTRATHECAL; INTRAVENOUS
Status: DISPENSED
Start: 2024-05-25